# Patient Record
Sex: FEMALE | Race: BLACK OR AFRICAN AMERICAN | Employment: FULL TIME | URBAN - METROPOLITAN AREA
[De-identification: names, ages, dates, MRNs, and addresses within clinical notes are randomized per-mention and may not be internally consistent; named-entity substitution may affect disease eponyms.]

---

## 2017-03-31 ENCOUNTER — APPOINTMENT (OUTPATIENT)
Dept: CT IMAGING | Age: 32
End: 2017-03-31
Attending: PHYSICIAN ASSISTANT
Payer: COMMERCIAL

## 2017-03-31 ENCOUNTER — HOSPITAL ENCOUNTER (EMERGENCY)
Age: 32
Discharge: HOME | End: 2017-03-31
Attending: EMERGENCY MEDICINE | Admitting: EMERGENCY MEDICINE
Payer: COMMERCIAL

## 2017-03-31 VITALS
SYSTOLIC BLOOD PRESSURE: 114 MMHG | HEART RATE: 84 BPM | RESPIRATION RATE: 16 BRPM | DIASTOLIC BLOOD PRESSURE: 74 MMHG | BODY MASS INDEX: 16.79 KG/M2 | TEMPERATURE: 98.1 F | OXYGEN SATURATION: 99 % | HEIGHT: 67 IN | WEIGHT: 107 LBS

## 2017-03-31 DIAGNOSIS — R10.32 ABDOMINAL PAIN, LLQ (LEFT LOWER QUADRANT): Primary | ICD-10-CM

## 2017-03-31 LAB
ALBUMIN SERPL BCP-MCNC: 3.6 G/DL (ref 3.4–5)
ALBUMIN/GLOB SERPL: 1 {RATIO} (ref 0.8–1.7)
ALP SERPL-CCNC: 52 U/L (ref 45–117)
ALT SERPL-CCNC: 18 U/L (ref 13–56)
ANION GAP BLD CALC-SCNC: 11 MMOL/L (ref 3–18)
APPEARANCE UR: ABNORMAL
AST SERPL W P-5'-P-CCNC: 13 U/L (ref 15–37)
BACTERIA URNS QL MICRO: ABNORMAL /HPF
BASOPHILS # BLD AUTO: 0 K/UL (ref 0–0.06)
BASOPHILS # BLD: 0 % (ref 0–2)
BILIRUB SERPL-MCNC: 0.4 MG/DL (ref 0.2–1)
BILIRUB UR QL: NEGATIVE
BUN SERPL-MCNC: 11 MG/DL (ref 7–18)
BUN/CREAT SERPL: 17 (ref 12–20)
CALCIUM SERPL-MCNC: 9.4 MG/DL (ref 8.5–10.1)
CHLORIDE SERPL-SCNC: 105 MMOL/L (ref 100–108)
CO2 SERPL-SCNC: 27 MMOL/L (ref 21–32)
COLOR UR: ABNORMAL
CREAT SERPL-MCNC: 0.63 MG/DL (ref 0.6–1.3)
DIFFERENTIAL METHOD BLD: ABNORMAL
EOSINOPHIL # BLD: 0 K/UL (ref 0–0.4)
EOSINOPHIL NFR BLD: 1 % (ref 0–5)
EPITH CASTS URNS QL MICRO: ABNORMAL /LPF (ref 0–5)
ERYTHROCYTE [DISTWIDTH] IN BLOOD BY AUTOMATED COUNT: 18.7 % (ref 11.6–14.5)
GLOBULIN SER CALC-MCNC: 3.6 G/DL (ref 2–4)
GLUCOSE SERPL-MCNC: 92 MG/DL (ref 74–99)
GLUCOSE UR STRIP.AUTO-MCNC: NEGATIVE MG/DL
HCG UR QL: NEGATIVE
HCT VFR BLD AUTO: 33.6 % (ref 35–45)
HGB BLD-MCNC: 9.7 G/DL (ref 12–16)
HGB UR QL STRIP: ABNORMAL
KETONES UR QL STRIP.AUTO: 40 MG/DL
LEUKOCYTE ESTERASE UR QL STRIP.AUTO: ABNORMAL
LIPASE SERPL-CCNC: 145 U/L (ref 73–393)
LYMPHOCYTES # BLD AUTO: 21 % (ref 21–52)
LYMPHOCYTES # BLD: 0.8 K/UL (ref 0.9–3.6)
MCH RBC QN AUTO: 22.1 PG (ref 24–34)
MCHC RBC AUTO-ENTMCNC: 28.9 G/DL (ref 31–37)
MCV RBC AUTO: 76.7 FL (ref 74–97)
MONOCYTES # BLD: 0.5 K/UL (ref 0.05–1.2)
MONOCYTES NFR BLD AUTO: 12 % (ref 3–10)
MUCOUS THREADS URNS QL MICRO: ABNORMAL /LPF
NEUTS SEG # BLD: 2.4 K/UL (ref 1.8–8)
NEUTS SEG NFR BLD AUTO: 66 % (ref 40–73)
NITRITE UR QL STRIP.AUTO: NEGATIVE
PH UR STRIP: 7 [PH] (ref 5–8)
PLATELET # BLD AUTO: 256 K/UL (ref 135–420)
PMV BLD AUTO: 9.3 FL (ref 9.2–11.8)
POTASSIUM SERPL-SCNC: 3.7 MMOL/L (ref 3.5–5.5)
PROT SERPL-MCNC: 7.2 G/DL (ref 6.4–8.2)
PROT UR STRIP-MCNC: 30 MG/DL
RBC # BLD AUTO: 4.38 M/UL (ref 4.2–5.3)
RBC #/AREA URNS HPF: ABNORMAL /HPF (ref 0–5)
SODIUM SERPL-SCNC: 143 MMOL/L (ref 136–145)
SP GR UR REFRACTOMETRY: >1.03 (ref 1–1.03)
UROBILINOGEN UR QL STRIP.AUTO: 1 EU/DL (ref 0.2–1)
WBC # BLD AUTO: 3.7 K/UL (ref 4.6–13.2)
WBC URNS QL MICRO: ABNORMAL /HPF (ref 0–5)

## 2017-03-31 PROCEDURE — 83690 ASSAY OF LIPASE: CPT | Performed by: PHYSICIAN ASSISTANT

## 2017-03-31 PROCEDURE — 85025 COMPLETE CBC W/AUTO DIFF WBC: CPT | Performed by: PHYSICIAN ASSISTANT

## 2017-03-31 PROCEDURE — 99283 EMERGENCY DEPT VISIT LOW MDM: CPT

## 2017-03-31 PROCEDURE — 96361 HYDRATE IV INFUSION ADD-ON: CPT

## 2017-03-31 PROCEDURE — 80053 COMPREHEN METABOLIC PANEL: CPT | Performed by: PHYSICIAN ASSISTANT

## 2017-03-31 PROCEDURE — 81001 URINALYSIS AUTO W/SCOPE: CPT | Performed by: EMERGENCY MEDICINE

## 2017-03-31 PROCEDURE — 96374 THER/PROPH/DIAG INJ IV PUSH: CPT

## 2017-03-31 PROCEDURE — 74176 CT ABD & PELVIS W/O CONTRAST: CPT

## 2017-03-31 PROCEDURE — 74011250636 HC RX REV CODE- 250/636: Performed by: PHYSICIAN ASSISTANT

## 2017-03-31 PROCEDURE — 81025 URINE PREGNANCY TEST: CPT

## 2017-03-31 RX ORDER — ONDANSETRON 2 MG/ML
4 INJECTION INTRAMUSCULAR; INTRAVENOUS
Status: COMPLETED | OUTPATIENT
Start: 2017-03-31 | End: 2017-03-31

## 2017-03-31 RX ORDER — ONDANSETRON 4 MG/1
4 TABLET, FILM COATED ORAL
Qty: 15 TAB | Refills: 0 | Status: SHIPPED | OUTPATIENT
Start: 2017-03-31 | End: 2017-07-08

## 2017-03-31 RX ADMIN — ONDANSETRON 4 MG: 2 INJECTION INTRAMUSCULAR; INTRAVENOUS at 18:34

## 2017-03-31 RX ADMIN — SODIUM CHLORIDE 1000 ML: 900 INJECTION, SOLUTION INTRAVENOUS at 18:34

## 2017-03-31 NOTE — ED TRIAGE NOTES
C/o abd pain, vomiting for 7 days. States she had a seizure today, denies taking her keppra, states she thinks it causes her to have more seizures, states she drove herself here today. Sepsis Screening completed    (  )Patient meets SIRS criteria. ( x )Patient does not meet SIRS criteria.       SIRS Criteria is achieved when two or more of the following are present   Temperature < 96.8°F (36°C) or > 100.9°F (38.3°C)   Heart Rate > 90 beats per minute   Respiratory Rate > 20 breaths per minute   WBC count > 12,000 or <4,000 or > 10% bands

## 2017-03-31 NOTE — ED PROVIDER NOTES
[address]  EMERGENCY DEPARTMENT HISTORY AND PHYSICAL EXAM       Date: 3/31/2017   Patient Name: Hilda Cook   YOB: 1985  Medical Record Number: [id number]    History of Presenting Illness     Chief Complaint   Patient presents with    Abdominal Pain        History Provided By:  patient    Additional History:   5:38 PM   Hilda Cook is a 34 y.o. female with PSHX of left-sided tubal ligation (5 months ago) presenting to the ED C/O gradually worsening left lower suprapubic pain that radiates to her back at her incision site, onset 1 week ago. Associated sxs include dysuria. Pt has tried Toradol for her pain with minimal relief and stopped taking it due to n/v. LMP last week. Pt asked about seizures and has a PMHX of seizure, but states that this is not why she is here today. PMHX ovarian cysts. Pt denies PMHX of kidney stones, PSHX appendectomy, fever, diarrhea, vaginal bleeding, vaginal discharge, and any other symptoms or complaints. Primary Care Provider: None   Specialist:    Past History     Past Medical History:   Past Medical History:   Diagnosis Date    Seizures ([address]         Past Surgical History:   Past Surgical History:   Procedure Laterality Date    HX GYN      tube and ovary removed on left side        Family History:   No family history on file. Social History:   Social History   Substance Use Topics    Smoking status: Current Every Day Smoker    Smokeless tobacco: Not on file    Alcohol use No        Allergies:   No Known Allergies     Review of Systems   Review of Systems   Constitutional: Negative for fever. Gastrointestinal: Positive for abdominal pain, nausea and vomiting. Negative for diarrhea. Genitourinary: Positive for dysuria. Negative for vaginal bleeding and vaginal discharge. All other systems reviewed and are negative.       Physical Exam  Vitals:    03/31/17 1713 03/31/17 2022   BP: 112/81 114/74   Pulse: 90 84   Resp: 14 16   Temp: 98.1 °F (36.7 °C)    SpO2: 96% 99%   Weight: 48.5 kg (107 lb)    Height: 5' 7\" (1.702 m)        Physical Exam   Constitutional: She is oriented to person, place, and time. She appears well-developed and well-nourished. No distress. Pt appears well lying on bed in no distress, speaking in full sentences without evidence of dyspnea, increased work of breathing or any obvious pain at this time     HENT:   Head: Normocephalic and atraumatic. Neck: Normal range of motion. Neck supple. Cardiovascular: Normal rate, regular rhythm, normal heart sounds and intact distal pulses. No murmur heard. Pulmonary/Chest: Effort normal and breath sounds normal. No respiratory distress. She has no wheezes. She has no rales. Abdominal: Soft. Bowel sounds are normal. There is no hepatosplenomegaly. There is no tenderness. There is no rigidity, no rebound, no guarding and no CVA tenderness. abd soft, TTP over the LLQ and LLQ, non tender when distracted, no guarding or rebound    Genitourinary:   Genitourinary Comments: refused   Neurological: She is alert and oriented to person, place, and time. Skin: Skin is warm and dry. No rash noted. No erythema. No pallor. Psychiatric: She has a normal mood and affect. Judgment normal.   Nursing note and vitals reviewed.       Diagnostic Study Results     Labs -      Recent Results (from the past 12 hour(s))   URINALYSIS W/ RFLX MICROSCOPIC    Collection Time: 03/31/17  5:15 PM   Result Value Ref Range    Color DARK YELLOW      Appearance CLOUDY      Specific gravity >1.030 (H) 1.005 - 1.030    pH (UA) 7.0 5.0 - 8.0      Protein 30 (A) NEG mg/dL    Glucose NEGATIVE  NEG mg/dL    Ketone 40 (A) NEG mg/dL    Bilirubin NEGATIVE  NEG      Blood MODERATE (A) NEG      Urobilinogen 1.0 0.2 - 1.0 EU/dL    Nitrites NEGATIVE  NEG      Leukocyte Esterase SMALL (A) NEG     URINE MICROSCOPIC ONLY    Collection Time: 03/31/17  5:15 PM   Result Value Ref Range    WBC 0 to 3 0 - 5 /hpf RBC 11 to 20 0 - 5 /hpf    Epithelial cells 2+ 0 - 5 /lpf    Bacteria 1+ (A) NEG /hpf    Mucus 4+ (A) NEG /lpf   HCG URINE, QL. - POC    Collection Time: 03/31/17  6:22 PM   Result Value Ref Range    Pregnancy test,urine (POC) NEGATIVE  NEG     CBC WITH AUTOMATED DIFF    Collection Time: 03/31/17  6:32 PM   Result Value Ref Range    WBC 3.7 (L) 4.6 - 13.2 K/uL    RBC 4.38 4.20 - 5.30 M/uL    HGB 9.7 (L) 12.0 - 16.0 g/dL    HCT 33.6 (L) 35.0 - 45.0 %    MCV 76.7 74.0 - 97.0 FL    MCH 22.1 (L) 24.0 - 34.0 PG    MCHC 28.9 (L) 31.0 - 37.0 g/dL    RDW 18.7 (H) 11.6 - 14.5 %    PLATELET 708 641 - 814 K/uL    MPV 9.3 9.2 - 11.8 FL    NEUTROPHILS 66 40 - 73 %    LYMPHOCYTES 21 21 - 52 %    MONOCYTES 12 (H) 3 - 10 %    EOSINOPHILS 1 0 - 5 %    BASOPHILS 0 0 - 2 %    ABS. NEUTROPHILS 2.4 1.8 - 8.0 K/UL    ABS. LYMPHOCYTES 0.8 (L) 0.9 - 3.6 K/UL    ABS. MONOCYTES 0.5 0.05 - 1.2 K/UL    ABS. EOSINOPHILS 0.0 0.0 - 0.4 K/UL    ABS. BASOPHILS 0.0 0.0 - 0.06 K/UL    DF AUTOMATED     METABOLIC PANEL, COMPREHENSIVE    Collection Time: 03/31/17  6:32 PM   Result Value Ref Range    Sodium 143 136 - 145 mmol/L    Potassium 3.7 3.5 - 5.5 mmol/L    Chloride 105 100 - 108 mmol/L    CO2 27 21 - 32 mmol/L    Anion gap 11 3.0 - 18 mmol/L    Glucose 92 74 - 99 mg/dL    BUN 11 7.0 - 18 MG/DL    Creatinine 0.63 0.6 - 1.3 MG/DL    BUN/Creatinine ratio 17 12 - 20      GFR est AA >60 >60 ml/min/1.73m2    GFR est non-AA >60 >60 ml/min/1.73m2    Calcium 9.4 8.5 - 10.1 MG/DL    Bilirubin, total 0.4 0.2 - 1.0 MG/DL    ALT (SGPT) 18 13 - 56 U/L    AST (SGOT) 13 (L) 15 - 37 U/L    Alk. phosphatase 52 45 - 117 U/L    Protein, total 7.2 6.4 - 8.2 g/dL    Albumin 3.6 3.4 - 5.0 g/dL    Globulin 3.6 2.0 - 4.0 g/dL    A-G Ratio 1.0 0.8 - 1.7     LIPASE    Collection Time: 03/31/17  6:32 PM   Result Value Ref Range    Lipase 145 73 - 393 U/L       Radiologic Studies -  CT ABD PELV WO CONT   Final Result   There are no findings to explain the patient's symptoms. The  calcific density in the superior pole the left kidney does not appear to cause  any inflammatory changes or obstruction, and likely represents a chronic finding  for this patient. The appendix is normal.    As read by the radiologist.         Medical Decision Making   I am the first provider for this patient. I reviewed the vital signs, available nursing notes, past medical history, past surgical history, family history and social history. Vital Signs-Reviewed the patient's vital signs. Patient Vitals for the past 12 hrs:   Temp Pulse Resp BP SpO2   03/31/17 2022 - 84 16 114/74 99 %   03/31/17 1713 98.1 °F (36.7 °C) 90 14 112/81 96 %       Pulse Oximetry Analysis - Normal 96% on RA. No intervention needed. Provider Notes:   DDX:   DDX Considered   Diverticulitis, constipation, gastroenteritis, appendicitis, SBO, LBO / fecal impaction, colitis, IBS, inflammatory bowel disease (Crohn's or ulcerative colitis), mesenteric ischemia, hernia (femoral, inguinal, umbilical), renal calculi, pregnancy, ectopic pregnancy, UTI, pyelo       Procedures      ED Course:     5:38 PM Initial assessment performed. Offered pain medication but pt needs a ride present for narcotic pain meds. Will given PO meds or Toradol after CT shows no surgical problems. 8:09 PM Pt states she would like to go home and would not like a pelvic. Discussed results with pt and the importance of following up with her PCP to have her urine rechecked. Pt understands why I am recommending pelvic and still refusing. sts she would just like to go home and take her meds. Pt states she does not have a PCP, but does have insurance. Will refer to a PCP. + leuk esterase w/o WBC in urine, ? Contaminated specimen. Will culture her urine. This was explained to pt, she understands and agrees.      Medications Given in the ED:  Medications   sodium chloride 0.9 % bolus infusion 1,000 mL (0 mL IntraVENous IV Completed 3/31/17 1945)   ondansetron [facility]) injection 4 mg (4 mg IntraVENous Given 3/31/17 1834)          Discharge Note:  8:13 PM  Patients results have been reviewed with them. Patient and/or family have verbally conveyed their understanding and agreement of the patient's signs, symptoms, diagnosis, treatment and prognosis and additionally agree to follow up as recommended or return to the Emergency Room should their condition change prior to their follow-up appointment. Patient verbally agrees with the care-plan and verbally conveys that all of their questions have been answered. Discharge instructions have also been provided to the patient with some educational information regarding their diagnosis as well a list of reasons why they would want to return to the ER prior to their follow-up appointment should their condition change. Diagnosis   Clinical Impression:   1. Abdominal pain, LLQ (left lower quadrant)         Follow-up Information     Follow up With Details Comments Contact Info    Padilla Saini DO Schedule an appointment as soon as possible for a visit in 2 days For recheck of urine. [address] [facility]      [facility] EMERGENCY DEPT  As needed, If symptoms worsen [address] [place] [id number]  [phone]          Discharge Medication List as of 3/31/2017  8:12 PM      START taking these medications    Details   ondansetron hcl (ZOFRAN, AS HYDROCHLORIDE,) 4 mg tablet Take 1 Tab by mouth every eight (8) hours as needed for Nausea. , Print, Disp15 Tab, R-0             _______________________________   Attestations:     SCRIBE ATTESTATION:  This note is prepared by Anila Islas, acting as Scribe for Raghu Salgado PA-C.    PROVIDER ATTESTATION:  Raghu Salgado PA-C: The scribe's documentation has been prepared under my direction and personally reviewed by me in its entirety.  I confirm that the note above accurately reflects all work, treatment, procedures, and medical decision making performed by me.   _______________________________

## 2017-04-01 NOTE — DISCHARGE INSTRUCTIONS

## 2017-04-01 NOTE — ED NOTES
I have reviewed discharge instructions with the patient. The patient verbalized understanding.   Patient armband removed and shredded, script x 1 given

## 2017-04-01 NOTE — ED NOTES
Call light answered, patient does not want pelvic exam and wants to go home. Provider notified and brought to bedside.

## 2017-07-08 ENCOUNTER — HOSPITAL ENCOUNTER (EMERGENCY)
Age: 32
Discharge: HOME | End: 2017-07-08
Attending: EMERGENCY MEDICINE
Payer: COMMERCIAL

## 2017-07-08 ENCOUNTER — APPOINTMENT (OUTPATIENT)
Dept: ULTRASOUND IMAGING | Age: 32
End: 2017-07-08
Attending: EMERGENCY MEDICINE
Payer: COMMERCIAL

## 2017-07-08 VITALS
OXYGEN SATURATION: 100 % | SYSTOLIC BLOOD PRESSURE: 116 MMHG | HEIGHT: 67 IN | WEIGHT: 110 LBS | RESPIRATION RATE: 18 BRPM | HEART RATE: 88 BPM | BODY MASS INDEX: 17.27 KG/M2 | TEMPERATURE: 98.7 F | DIASTOLIC BLOOD PRESSURE: 75 MMHG

## 2017-07-08 DIAGNOSIS — B96.89 BV (BACTERIAL VAGINOSIS): ICD-10-CM

## 2017-07-08 DIAGNOSIS — N76.0 BV (BACTERIAL VAGINOSIS): ICD-10-CM

## 2017-07-08 DIAGNOSIS — R10.2 PELVIC PAIN IN FEMALE: Primary | ICD-10-CM

## 2017-07-08 LAB
ALBUMIN SERPL BCP-MCNC: 3.6 G/DL (ref 3.4–5)
ALBUMIN/GLOB SERPL: 1 {RATIO} (ref 0.8–1.7)
ALP SERPL-CCNC: 62 U/L (ref 45–117)
ALT SERPL-CCNC: 15 U/L (ref 13–56)
ANION GAP BLD CALC-SCNC: 12 MMOL/L (ref 3–18)
APPEARANCE UR: CLEAR
AST SERPL W P-5'-P-CCNC: 13 U/L (ref 15–37)
BACTERIA URNS QL MICRO: ABNORMAL /HPF
BASOPHILS # BLD AUTO: 0 K/UL (ref 0–0.06)
BASOPHILS # BLD: 0 % (ref 0–2)
BILIRUB SERPL-MCNC: 0.3 MG/DL (ref 0.2–1)
BILIRUB UR QL: NEGATIVE
BUN SERPL-MCNC: 11 MG/DL (ref 7–18)
BUN/CREAT SERPL: 13 (ref 12–20)
CALCIUM SERPL-MCNC: 8.8 MG/DL (ref 8.5–10.1)
CHLORIDE SERPL-SCNC: 103 MMOL/L (ref 100–108)
CO2 SERPL-SCNC: 26 MMOL/L (ref 21–32)
COLOR UR: YELLOW
CREAT SERPL-MCNC: 0.82 MG/DL (ref 0.6–1.3)
DIFFERENTIAL METHOD BLD: ABNORMAL
EOSINOPHIL # BLD: 0.2 K/UL (ref 0–0.4)
EOSINOPHIL NFR BLD: 3 % (ref 0–5)
EPITH CASTS URNS QL MICRO: ABNORMAL /LPF (ref 0–5)
ERYTHROCYTE [DISTWIDTH] IN BLOOD BY AUTOMATED COUNT: 18.8 % (ref 11.6–14.5)
GLOBULIN SER CALC-MCNC: 3.6 G/DL (ref 2–4)
GLUCOSE SERPL-MCNC: 110 MG/DL (ref 74–99)
GLUCOSE UR STRIP.AUTO-MCNC: NEGATIVE MG/DL
HCG UR QL: NEGATIVE
HCT VFR BLD AUTO: 34.4 % (ref 35–45)
HGB BLD-MCNC: 10.3 G/DL (ref 12–16)
HGB UR QL STRIP: ABNORMAL
KETONES UR QL STRIP.AUTO: NEGATIVE MG/DL
LEUKOCYTE ESTERASE UR QL STRIP.AUTO: ABNORMAL
LIPASE SERPL-CCNC: 132 U/L (ref 73–393)
LYMPHOCYTES # BLD AUTO: 20 % (ref 21–52)
LYMPHOCYTES # BLD: 1.1 K/UL (ref 0.9–3.6)
MCH RBC QN AUTO: 23.1 PG (ref 24–34)
MCHC RBC AUTO-ENTMCNC: 29.9 G/DL (ref 31–37)
MCV RBC AUTO: 77.1 FL (ref 74–97)
MONOCYTES # BLD: 0.8 K/UL (ref 0.05–1.2)
MONOCYTES NFR BLD AUTO: 13 % (ref 3–10)
MUCOUS THREADS URNS QL MICRO: ABNORMAL /LPF
NEUTS SEG # BLD: 3.6 K/UL (ref 1.8–8)
NEUTS SEG NFR BLD AUTO: 64 % (ref 40–73)
NITRITE UR QL STRIP.AUTO: NEGATIVE
PH UR STRIP: >8.5 [PH] (ref 5–8)
PLATELET # BLD AUTO: 267 K/UL (ref 135–420)
PMV BLD AUTO: 10.2 FL (ref 9.2–11.8)
POTASSIUM SERPL-SCNC: 3.7 MMOL/L (ref 3.5–5.5)
PROT SERPL-MCNC: 7.2 G/DL (ref 6.4–8.2)
PROT UR STRIP-MCNC: 30 MG/DL
RBC # BLD AUTO: 4.46 M/UL (ref 4.2–5.3)
RBC #/AREA URNS HPF: ABNORMAL /HPF (ref 0–5)
SERVICE CMNT-IMP: NORMAL
SODIUM SERPL-SCNC: 141 MMOL/L (ref 136–145)
SP GR UR REFRACTOMETRY: 1.02 (ref 1–1.03)
UROBILINOGEN UR QL STRIP.AUTO: 1 EU/DL (ref 0.2–1)
WBC # BLD AUTO: 5.6 K/UL (ref 4.6–13.2)
WBC URNS QL MICRO: ABNORMAL /HPF (ref 0–5)
WET PREP GENITAL: NORMAL

## 2017-07-08 PROCEDURE — 85025 COMPLETE CBC W/AUTO DIFF WBC: CPT | Performed by: EMERGENCY MEDICINE

## 2017-07-08 PROCEDURE — 87086 URINE CULTURE/COLONY COUNT: CPT | Performed by: EMERGENCY MEDICINE

## 2017-07-08 PROCEDURE — 96374 THER/PROPH/DIAG INJ IV PUSH: CPT

## 2017-07-08 PROCEDURE — 81025 URINE PREGNANCY TEST: CPT

## 2017-07-08 PROCEDURE — 96372 THER/PROPH/DIAG INJ SC/IM: CPT

## 2017-07-08 PROCEDURE — 81001 URINALYSIS AUTO W/SCOPE: CPT | Performed by: EMERGENCY MEDICINE

## 2017-07-08 PROCEDURE — 87491 CHLMYD TRACH DNA AMP PROBE: CPT | Performed by: EMERGENCY MEDICINE

## 2017-07-08 PROCEDURE — 83690 ASSAY OF LIPASE: CPT | Performed by: EMERGENCY MEDICINE

## 2017-07-08 PROCEDURE — 80053 COMPREHEN METABOLIC PANEL: CPT | Performed by: EMERGENCY MEDICINE

## 2017-07-08 PROCEDURE — 76830 TRANSVAGINAL US NON-OB: CPT

## 2017-07-08 PROCEDURE — 96361 HYDRATE IV INFUSION ADD-ON: CPT

## 2017-07-08 PROCEDURE — 74011250636 HC RX REV CODE- 250/636: Performed by: EMERGENCY MEDICINE

## 2017-07-08 PROCEDURE — 87210 SMEAR WET MOUNT SALINE/INK: CPT | Performed by: EMERGENCY MEDICINE

## 2017-07-08 PROCEDURE — 99284 EMERGENCY DEPT VISIT MOD MDM: CPT

## 2017-07-08 PROCEDURE — 96375 TX/PRO/DX INJ NEW DRUG ADDON: CPT

## 2017-07-08 RX ORDER — KETOROLAC TROMETHAMINE 30 MG/ML
30 INJECTION, SOLUTION INTRAMUSCULAR; INTRAVENOUS
Status: COMPLETED | OUTPATIENT
Start: 2017-07-08 | End: 2017-07-08

## 2017-07-08 RX ORDER — HYDROMORPHONE HYDROCHLORIDE 1 MG/ML
1 INJECTION, SOLUTION INTRAMUSCULAR; INTRAVENOUS; SUBCUTANEOUS ONCE
Status: COMPLETED | OUTPATIENT
Start: 2017-07-08 | End: 2017-07-08

## 2017-07-08 RX ORDER — METRONIDAZOLE 500 MG/1
2000 TABLET ORAL
Qty: 4 TAB | Refills: 0 | Status: SHIPPED | OUTPATIENT
Start: 2017-07-08 | End: 2017-07-08

## 2017-07-08 RX ORDER — DICYCLOMINE HYDROCHLORIDE 10 MG/1
20 CAPSULE ORAL
Status: DISCONTINUED | OUTPATIENT
Start: 2017-07-08 | End: 2017-07-08

## 2017-07-08 RX ORDER — DICYCLOMINE HYDROCHLORIDE 10 MG/ML
20 INJECTION INTRAMUSCULAR ONCE
Status: COMPLETED | OUTPATIENT
Start: 2017-07-08 | End: 2017-07-08

## 2017-07-08 RX ORDER — HYDROCODONE BITARTRATE AND ACETAMINOPHEN 5; 325 MG/1; MG/1
1 TABLET ORAL
Qty: 10 TAB | Refills: 0 | Status: SHIPPED | OUTPATIENT
Start: 2017-07-08 | End: 2018-03-06

## 2017-07-08 RX ORDER — ONDANSETRON 2 MG/ML
4 INJECTION INTRAMUSCULAR; INTRAVENOUS
Status: COMPLETED | OUTPATIENT
Start: 2017-07-08 | End: 2017-07-08

## 2017-07-08 RX ORDER — HYDROMORPHONE HYDROCHLORIDE 1 MG/ML
1 INJECTION, SOLUTION INTRAMUSCULAR; INTRAVENOUS; SUBCUTANEOUS
Status: DISCONTINUED | OUTPATIENT
Start: 2017-07-08 | End: 2017-07-08 | Stop reason: HOSPADM

## 2017-07-08 RX ADMIN — ONDANSETRON 4 MG: 2 INJECTION INTRAMUSCULAR; INTRAVENOUS at 06:39

## 2017-07-08 RX ADMIN — HYDROMORPHONE HYDROCHLORIDE 1 MG: 1 INJECTION, SOLUTION INTRAMUSCULAR; INTRAVENOUS; SUBCUTANEOUS at 07:02

## 2017-07-08 RX ADMIN — SODIUM CHLORIDE 1000 ML: 900 INJECTION, SOLUTION INTRAVENOUS at 06:38

## 2017-07-08 RX ADMIN — DICYCLOMINE HYDROCHLORIDE 20 MG: 20 INJECTION, SOLUTION INTRAMUSCULAR at 07:54

## 2017-07-08 RX ADMIN — ONDANSETRON 4 MG: 2 INJECTION INTRAMUSCULAR; INTRAVENOUS at 07:02

## 2017-07-08 RX ADMIN — KETOROLAC TROMETHAMINE 30 MG: 30 INJECTION, SOLUTION INTRAMUSCULAR at 06:40

## 2017-07-08 NOTE — ED NOTES
RN in room for purpose of hourly rounding. Room checked for trash and safety hazards. Patient is. ..    [  ] seated at bedside. [x  ] lying in bed with side rails up and call bell in reach. [  ] lying in with call bell in reach and continuous monitoring in place. Pain reduction interventions. ..    [  ] not indicated at this time      include the following   [  ] administration of analgesic medications   [  ] lights turned down   [  ] patient offered a cool washcloth   [  ] heat applied   [  ] ice applied   [ x ] patient repositioned    Restroom needs addressed. Patient is. ... [ x ] Not in need restroom assistance at this time  [  ] Ambulatory as needed to the restroom  [  ] Assisted to bedside commode  [  ] Assisted with use of bed pan  [  ] Provided with a urinal    Position. .. [x  ] Patient does not require assistance with repositioning  [  ] Patient repositions with assistance of RN  [  ] Patient repositioned with assistance of RN and other ED staff.

## 2017-07-08 NOTE — ED NOTES
Report received from Sacha Peguero, pt laying in bed, appears to be in pain, updated pt that she just received IV dilaudid and we will continue to monitor her pain.

## 2017-07-08 NOTE — ED TRIAGE NOTES
LLQ abdominal pain with N/V/D x 2 days. Denies vaginal bleeding or discharge or dysuria. Denies fever or chills. Sepsis Screening completed    (  )Patient meets SIRS criteria. ( xx )Patient does not meet SIRS criteria.       SIRS Criteria is achieved when two or more of the following are present   Temperature < 96.8°F (36°C) or > 100.9°F (38.3°C)   Heart Rate > 90 beats per minute   Respiratory Rate > 20 breaths per minute   WBC count > 12,000 or <4,000 or > 10% bands

## 2017-07-08 NOTE — ED NOTES
RN in room for purpose of hourly rounding. Room checked for trash and safety hazards. Patient is. ..    [  ] seated at bedside. [x  ] lying in bed with side rails up and call bell in reach. [  ] lying in with call bell in reach and continuous monitoring in place. Pain reduction interventions. .. [ x ] not indicated at this time      include the following   [  ] administration of analgesic medications   [  ] lights turned down   [  ] patient offered a cool washcloth   [  ] heat applied   [  ] ice applied   [  ] patient repositioned    Restroom needs addressed. Patient is. ... [ x ] Not in need restroom assistance at this time  [  ] Ambulatory as needed to the restroom  [  ] Assisted to bedside commode  [  ] Assisted with use of bed pan  [  ] Provided with a urinal    Position. .. [ x ] Patient does not require assistance with repositioning  [  ] Patient repositions with assistance of RN  [  ] Patient repositioned with assistance of RN and other ED staff.

## 2017-07-08 NOTE — ED NOTES
Bedside shift change report given to Anjali Ibarra RN (oncoming nurse) by Janeen Lawson RN (offgoing nurse). Report included the following information SBAR, ED Summary and MAR.

## 2017-07-08 NOTE — DISCHARGE INSTRUCTIONS
Bacterial Vaginosis: Care Instructions  Your Care Instructions    Bacterial vaginosis is a type of vaginal infection. It is caused by excess growth of certain bacteria that are normally found in the vagina. Symptoms can include itching, swelling, pain when you urinate or have sex, and a gray or yellow discharge with a \"fishy\" odor. It is not considered an infection that is spread through sexual contact. Although symptoms can be annoying and uncomfortable, bacterial vaginosis does not usually cause other health problems. However, if you have it while you are pregnant, it can cause complications. While the infection may go away on its own, most doctors use antibiotics to treat it. You may have been prescribed pills or vaginal cream. With treatment, bacterial vaginosis usually clears up in 5 to 7 days. Follow-up care is a key part of your treatment and safety. Be sure to make and go to all appointments, and call your doctor if you are having problems. It's also a good idea to know your test results and keep a list of the medicines you take. How can you care for yourself at home? · Take your antibiotics as directed. Do not stop taking them just because you feel better. You need to take the full course of antibiotics. · Do not eat or drink anything that contains alcohol if you are taking metronidazole (Flagyl). · Keep using your medicine if you start your period. Use pads instead of tampons while using a vaginal cream or suppository. Tampons can absorb the medicine. · Wear loose cotton clothing. Do not wear nylon and other materials that hold body heat and moisture close to the skin. · Do not scratch. Relieve itching with a cold pack or a cool bath. · Do not wash your vaginal area more than once a day. Use plain water or a mild, unscented soap. Do not douche. When should you call for help?   Watch closely for changes in your health, and be sure to contact your doctor if:  · You have unexpected vaginal bleeding. · You have a fever. · You have new or increased pain in your vagina or pelvis. · You are not getting better after 1 week. · Your symptoms return after you finish the course of your medicine. Where can you learn more? Go to http://joanne-yana.info/. [facility] in the search box to learn more about \"Bacterial Vaginosis: Care Instructions. \"  Current as of: October 13, 2016  Content Version: 11.3  © 5306-9758 [employer]. Care instructions adapted under license by [employer] (which disclaims liability or warranty for this information). If you have questions about a medical condition or this instruction, always ask your healthcare professional. [address] any warranty or liability for your use of this information. Uterine Fibroids: Care Instructions  Your Care Instructions    Uterine fibroids are growths in the uterus. Fibroids aren't cancer. Doctors don't know what causes fibroids. Fibroids are very common in women during their childbearing years. Fibroids can grow on the inside of the uterus, in the muscle wall of the uterus, or near the outside wall of the uterus. In some women, fibroids cause painful cramps and heavy periods. In these cases, taking anti-inflammatory medicines, birth control pills, or using an intrauterine device (IUD) often helps decrease symptoms. Sometimes surgery is needed to treat fibroids. But if you are near menopause, you may want to wait and see if your symptoms get better. Most fibroids shrink and go away after menopause, when your menstrual periods stop completely. Follow-up care is a key part of your treatment and safety. Be sure to make and go to all appointments, and call your doctor if you are having problems. It's also a good idea to know your test results and keep a list of the medicines you take. How can you care for yourself at home?   · If your doctor gave you medicine, take it as exactly as prescribed. Call your doctor if you think you are having a problem with your medicine. · Take anti-inflammatory medicines for pain. These include ibuprofen (Advil, Motrin) and naproxen (Aleve). Read and follow all instructions on the label. · Use heat, such as a hot water bottle or a heating pad set on low, or a warm bath to relax tense muscles and relieve cramping. Put a thin cloth between the heating pad and your skin. Never go to sleep with a heating pad on. · Lie down and put a pillow under your knees. Or, lie on your side and bring your knees up to your chest. These positions may help relieve belly pain or pressure. · Keep track of how many sanitary pads or tampons you use each day. · Get at least 30 minutes of exercise on most days of the week. Walking is a good choice. You also may want to do other activities, such as running, swimming, cycling, or playing tennis or team sports. · If you bleed longer than usual or have heavy bleeding, take a daily multivitamin with iron. When should you call for help? Call 911 anytime you think you may need emergency care. For example, call if:  · You passed out (lost consciousness). · You have sudden, severe pain in your belly or pelvis. Call your doctor now or seek immediate medical care if:  · You have severe vaginal bleeding. This means that you are soaking through your usual pads each hour for 2 or more hours. · You have new belly or pelvic pain. · You are dizzy or lightheaded, or you feel like you may faint. · You have new or unexpected vaginal bleeding. Watch closely for changes in your health, and be sure to contact your doctor if:  · You have new vaginal discharge. · You have ongoing heavy or irregular vaginal bleeding. · You have pelvic pain or a heavy feeling in your lower belly that doesn't go away. · You have to urinate often. · You are more constipated than usual.  Where can you learn more?   Go to http://joanne-ynaa.info/. Enter B121 in the search box to learn more about \"Uterine Fibroids: Care Instructions. \"  Current as of: October 13, 2016  Content Version: 11.3  © 1993-1906 [employer]. Care instructions adapted under license by [employer] (which disclaims liability or warranty for this information). If you have questions about a medical condition or this instruction, always ask your healthcare professional. [place] [address] any warranty or liability for your use of this information. Functional Ovarian Cyst: Care Instructions  Your Care Instructions    A functional ovarian cyst is a sac that forms on the surface of a woman's ovary during ovulation. The sac holds a maturing egg. Usually the sac goes away after the egg is released. But if the egg is not released, or if the sac closes up after the egg is released, the sac can swell up with fluid and form a cyst.  Functional ovarian cysts are different than ovarian growths caused by other problems, such as cancer. Most functional ovarian cysts cause no symptoms and go away on their own. Some cause mild pain. Others can cause severe pain when they rupture or bleed. Follow-up care is a key part of your treatment and safety. Be sure to make and go to all appointments, and call your doctor if you are having problems. It's also a good idea to know your test results and keep a list of the medicines you take. How can you care for yourself at home? · Use heat, such as a hot water bottle, a heating pad set on low, or a warm bath, to relax tense muscles and relieve cramping. · Take pain medicines exactly as directed. ¨ If the doctor gave you a prescription medicine for pain, take it as prescribed. ¨ If you are not taking a prescription pain medicine, ask your doctor if you can take an over-the-counter medicine. · Avoid constipation.  Make sure you drink enough fluids and include fruits, vegetables, and fiber in your diet each day. Constipation does not cause ovarian cysts, but it may make your pelvic pain worse. When should you call for help? Call 911 anytime you think you may need emergency care. For example, call if:  · You passed out (lost consciousness). · You have sudden, severe pain in your belly or your pelvis. Call your doctor now or seek immediate medical care if:  · You have new belly or pelvic pain, or your pain gets worse. · You have severe vaginal bleeding. This means that you are soaking through your usual pads or tampons each hour for 2 or more hours. · You are dizzy or lightheaded, or you feel like you may faint. · You have pain or bleeding during or after sex. Watch closely for changes in your health, and be sure to contact your doctor if:  · Your pain keeps you from doing the things that you enjoy. · You do not get better as expected. Where can you learn more? Go to http://joanne-yana.info/. Enter I482 in the search box to learn more about \"Functional Ovarian Cyst: Care Instructions. \"  Current as of: October 13, 2016  Content Version: 11.3  © 0742-9892 [employer]. Care instructions adapted under license by [employer] (which disclaims liability or warranty for this information). If you have questions about a medical condition or this instruction, always ask your healthcare professional. [place] [address] any warranty or liability for your use of this information. Pelvic Pain: Care Instructions  Your Care Instructions    Pelvic pain, or pain in the lower belly, can have many causes. Often pelvic pain is not serious and gets better in a few days. If your pain continues or gets worse, you may need tests and treatment. Tell your doctor about any new symptoms. These may be signs of a serious problem. Follow-up care is a key part of your treatment and safety.  Be sure to make and go to all appointments, and call your doctor if you are having problems. It's also a good idea to know your test results and keep a list of the medicines you take. How can you care for yourself at home? · Rest until you feel better. Lie down, and raise your legs by placing a pillow under your knees. · Drink plenty of fluids. You may find that small, frequent sips are easier on your stomach than if you drink a lot at once. Avoid drinks with carbonation or caffeine, such as soda pop, tea, or coffee. · Try eating several small meals instead of 2 or 3 large ones. Eat mild foods, such as rice, dry toast or crackers, bananas, and applesauce. Avoid fatty and spicy foods, other fruits, and alcohol until 48 hours after your symptoms have gone away. · Take an over-the-counter pain medicine, such as acetaminophen (Tylenol), ibuprofen (Advil, Motrin), or naproxen (Aleve). Read and follow all instructions on the label. · Do not take two or more pain medicines at the same time unless the doctor told you to. Many pain medicines have acetaminophen, which is Tylenol. Too much acetaminophen (Tylenol) can be harmful. · You can put a heating pad, a warm cloth, or moist heat on your belly to relieve pain. When should you call for help? Call 911 anytime you think you may need emergency care. For example, call if:  · You passed out (lost consciousness). Call your doctor now or seek immediate medical care if:  · Your pain is getting worse. · Your pain becomes focused in one area of your belly. · You have severe vaginal bleeding. Severe means that you are soaking through your usual pads or tampons every hour for 2 or more hours and passing clots of blood. · You have new symptoms such as fever, urinary problems or unexpected vaginal bleeding. · You are dizzy or lightheaded, or you feel like you may faint. Watch closely for changes in your health, and be sure to contact your doctor if:  · You do not get better as expected. Where can you learn more?   Go to http://joanne-yana.info/. Enter [id number] in the search box to learn more about \"Pelvic Pain: Care Instructions. \"  Current as of: October 13, 2016  Content Version: 11.3  © 3758-5130 [employer]. Care instructions adapted under license by [employer] (which disclaims liability or warranty for this information). If you have questions about a medical condition or this instruction, always ask your healthcare professional. [place] [address] any warranty or liability for your use of this information.

## 2017-07-08 NOTE — ED NOTES
RN in room for purpose of hourly rounding. Room checked for trash and safety hazards. Patient is. ..    [  ] seated at bedside. [x  ] lying in bed with side rails up and call bell in reach. [  ] lying in with call bell in reach and continuous monitoring in place. Pain reduction interventions. .. [x  ] not indicated at this time      include the following   [  ] administration of analgesic medications   [  ] lights turned down   [  ] patient offered a cool washcloth   [  ] heat applied   [  ] ice applied   [  ] patient repositioned    Restroom needs addressed. Patient is. ... [x  ] Not in need restroom assistance at this time  [  ] Ambulatory as needed to the restroom  [  ] Assisted to bedside commode  [  ] Assisted with use of bed pan  [  ] Provided with a urinal    Position. .. [ x ] Patient does not require assistance with repositioning  [  ] Patient repositions with assistance of RN  [  ] Patient repositioned with assistance of RN and other ED staff.

## 2017-07-08 NOTE — ED NOTES
RN in room for purpose of hourly rounding. Room checked for trash and safety hazards. Patient is. ..    [  ] seated at bedside. [ x ] lying in bed with side rails up and call bell in reach. [  ] lying in with call bell in reach and continuous monitoring in place. Pain reduction interventions. ..    [  ] not indicated at this time      include the following   [  ] administration of analgesic medications   [  ] lights turned down   [  ] patient offered a cool washcloth   [  ] heat applied   [  ] ice applied   [ x ] patient repositioned    Restroom needs addressed. Patient is. ... [ x ] Not in need restroom assistance at this time  [  ] Ambulatory as needed to the restroom  [  ] Assisted to bedside commode  [  ] Assisted with use of bed pan  [  ] Provided with a urinal    Position. .. [x  ] Patient does not require assistance with repositioning  [  ] Patient repositions with assistance of RN  [  ] Patient repositioned with assistance of RN and other ED staff.

## 2017-07-08 NOTE — ED PROVIDER NOTES
[address]  EMERGENCY DEPARTMENT HISTORY AND PHYSICAL EXAM       Date: 7/8/2017   Patient Name: Marci Villar   YOB: 1985  Medical Record Number: [id number]    History of Presenting Illness     Chief Complaint   Patient presents with    Abdominal Pain    Vomiting        History Provided By:  patient    Additional History: 6:15 AM  Marci Vilalr is a 34 y.o. female who presents to the emergency department C/O 7/10 left sided abdominal pain onset 2 days ago. Associated symptoms include N/V/D. Diarrhea began 11 hours ago. [id number]. Also c/o right hand stiffness. Pt has taken Toradol for pain, which she takes on a monthly basisReports recent traveled to [place] 2 weeks ago on a cruise. PMHx of seizures. PSHx of cervix removal due to chronic pain and left sided salpingectomy. Endorses tobacco use (~1/2 PPD). Endorses rare EtOH use, last drink was yesterday. FHx of DM (mother) and sickle cell (sister). Pt denies fever, chills, dysuria, difficulty urinating, hx ectopic pregnancy, CP, SOB, vaginal bleeding, and any other Sx or complaints. Primary Care Provider: None   Specialist:    Past History     Past Medical History:   Past Medical History:   Diagnosis Date    Seizures ([address]         Past Surgical History:   Past Surgical History:   Procedure Laterality Date    HX GYN      tube and ovary removed on left side        Family History:   History reviewed. No pertinent family history. Social History:   Social History   Substance Use Topics    Smoking status: Current Every Day Smoker    Smokeless tobacco: Never Used    Alcohol use No        Allergies:   No Known Allergies     Review of Systems   Review of Systems   Constitutional: Negative for chills and fever. Respiratory: Negative for shortness of breath. Cardiovascular: Negative for chest pain. Gastrointestinal: Positive for abdominal pain, diarrhea, nausea and vomiting.    Genitourinary: Negative for difficulty urinating, dysuria and vaginal bleeding. Musculoskeletal: Positive for arthralgias (right hand stiffness). All other systems reviewed and are negative. Physical Exam  Vitals:    07/08/17 0647 07/08/17 0912 07/08/17 1004 07/08/17 1141   BP: 125/65 101/75 99/72 114/73   Pulse: 89 63 65 71   Resp: 18      Temp: 98.7 °F (37.1 °C)      SpO2: 100% 100% 100% 100%   Weight: 49.9 kg (110 lb)      Height: 5' 7\" (1.702 m)          Physical Exam   Constitutional: She is oriented to person, place, and time. She appears well-developed and well-nourished. Non-toxic appearance. No distress. Uncomfortable appearing. Tearful. HENT:   Head: Normocephalic and atraumatic. Right Ear: External ear normal.   Left Ear: External ear normal.   Mouth/Throat: Oropharynx is clear and moist. No oropharyngeal exudate. Eyes: Conjunctivae and EOM are normal. Pupils are equal, round, and reactive to light. No scleral icterus. No pallor   Neck: Normal range of motion. Neck supple. No JVD present. No tracheal deviation present. No thyromegaly present. Cardiovascular: Normal rate, regular rhythm and normal heart sounds. Pulmonary/Chest: Effort normal and breath sounds normal. No stridor. No respiratory distress. Abdominal: Soft. Bowel sounds are normal. She exhibits no distension. There is tenderness in the left upper quadrant and left lower quadrant. There is guarding (voluntary). There is no rebound and no CVA tenderness. No true peritoneal signs. Genitourinary: Vagina normal. There is no lesion on the right labia. There is no lesion on the left labia. Genitourinary Comments: Vaginismus. NEFG. No adenopathy. No visible lesions. Musculoskeletal: Normal range of motion. She exhibits no edema or tenderness. No soft tissue injuries   Lymphadenopathy:     She has no cervical adenopathy. Neurological: She is alert and oriented to person, place, and time. She has normal reflexes. No cranial nerve deficit. Coordination normal.   Skin: Skin is warm and dry. No rash noted. She is not diaphoretic. No erythema. Psychiatric: She has a normal mood and affect. Her behavior is normal. Judgment and thought content normal.   Nursing note and vitals reviewed. Diagnostic Study Results     Labs -      Recent Results (from the past 12 hour(s))   CBC WITH AUTOMATED DIFF    Collection Time: 07/08/17  6:35 AM   Result Value Ref Range    WBC 5.6 4.6 - 13.2 K/uL    RBC 4.46 4.20 - 5.30 M/uL    HGB 10.3 (L) 12.0 - 16.0 g/dL    HCT 34.4 (L) 35.0 - 45.0 %    MCV 77.1 74.0 - 97.0 FL    MCH 23.1 (L) 24.0 - 34.0 PG    MCHC 29.9 (L) 31.0 - 37.0 g/dL    RDW 18.8 (H) 11.6 - 14.5 %    PLATELET 761 885 - 770 K/uL    MPV 10.2 9.2 - 11.8 FL    NEUTROPHILS 64 40 - 73 %    LYMPHOCYTES 20 (L) 21 - 52 %    MONOCYTES 13 (H) 3 - 10 %    EOSINOPHILS 3 0 - 5 %    BASOPHILS 0 0 - 2 %    ABS. NEUTROPHILS 3.6 1.8 - 8.0 K/UL    ABS. LYMPHOCYTES 1.1 0.9 - 3.6 K/UL    ABS. MONOCYTES 0.8 0.05 - 1.2 K/UL    ABS. EOSINOPHILS 0.2 0.0 - 0.4 K/UL    ABS. BASOPHILS 0.0 0.0 - 0.06 K/UL    DF AUTOMATED     METABOLIC PANEL, COMPREHENSIVE    Collection Time: 07/08/17  6:35 AM   Result Value Ref Range    Sodium 141 136 - 145 mmol/L    Potassium 3.7 3.5 - 5.5 mmol/L    Chloride 103 100 - 108 mmol/L    CO2 26 21 - 32 mmol/L    Anion gap 12 3.0 - 18 mmol/L    Glucose 110 (H) 74 - 99 mg/dL    BUN 11 7.0 - 18 MG/DL    Creatinine 0.82 0.6 - 1.3 MG/DL    BUN/Creatinine ratio 13 12 - 20      GFR est AA >60 >60 ml/min/1.73m2    GFR est non-AA >60 >60 ml/min/1.73m2    Calcium 8.8 8.5 - 10.1 MG/DL    Bilirubin, total 0.3 0.2 - 1.0 MG/DL    ALT (SGPT) 15 13 - 56 U/L    AST (SGOT) 13 (L) 15 - 37 U/L    Alk.  phosphatase 62 45 - 117 U/L    Protein, total 7.2 6.4 - 8.2 g/dL    Albumin 3.6 3.4 - 5.0 g/dL    Globulin 3.6 2.0 - 4.0 g/dL    A-G Ratio 1.0 0.8 - 1.7     LIPASE    Collection Time: 07/08/17  6:35 AM   Result Value Ref Range    Lipase 132 73 - 393 U/L   URINALYSIS W/ RFLX MICROSCOPIC    Collection Time: 07/08/17  6:40 AM   Result Value Ref Range    Color YELLOW      Appearance CLEAR      Specific gravity 1.022 1.005 - 1.030      pH (UA) >8.5 (H) 5.0 - 8.0    Protein 30 (A) NEG mg/dL    Glucose NEGATIVE  NEG mg/dL    Ketone NEGATIVE  NEG mg/dL    Bilirubin NEGATIVE  NEG      Blood TRACE (A) NEG      Urobilinogen 1.0 0.2 - 1.0 EU/dL    Nitrites NEGATIVE  NEG      Leukocyte Esterase TRACE (A) NEG     URINE MICROSCOPIC ONLY    Collection Time: 07/08/17  6:40 AM   Result Value Ref Range    WBC 2 to 3 0 - 5 /hpf    RBC 8 to 10 0 - 5 /hpf    Epithelial cells 4 to 6 0 - 5 /lpf    Bacteria FEW (A) NEG /hpf    Mucus FEW (A) NEG /lpf   HCG URINE, QL. - POC    Collection Time: 07/08/17  6:47 AM   Result Value Ref Range    Pregnancy test,urine (POC) NEGATIVE  NEG     WET PREP    Collection Time: 07/08/17  7:00 AM   Result Value Ref Range    Special Requests: NO SPECIAL REQUESTS      Wet prep CLUE CELLS PRESENT      Wet prep NO YEAST SEEN      Wet prep NO TRICHOMONAS SEEN         Radiologic Studies -  The following have been ordered and reviewed:     US TRANSVAGINAL   Final Result   Fibroid uterus     Physiologic right ovarian cyst    As read by the radiologist.          Medical Decision Making   I am the first provider for this patient. I reviewed the vital signs, available nursing notes, past medical history, past surgical history, family history and social history. Vital Signs-Reviewed the patient's vital signs. Patient Vitals for the past 12 hrs:   Temp Pulse Resp BP SpO2   07/08/17 1141 - 71 - 114/73 100 %   07/08/17 1004 - 65 - 99/72 100 %   07/08/17 0912 - 63 - 101/75 100 %   07/08/17 0647 98.7 °F (37.1 °C) 89 18 125/65 100 %       Pulse Oximetry Analysis - Normal 100% on room air     Old Medical Records: Nursing notes. Provider Notes:   DDx: most likely acute exacerbation of chronic pain, but should also consider ectopic, ovarian torsion or cyst, endometriosis.  Diarrhea does suggest colitis or diverticular dz, which she states is unusual.     Procedures:   Procedures    ED Course:  6:15 AM  Initial assessment performed. The patients presenting problems have been discussed, and they are in agreement with the care plan formulated and outlined with them. I have encouraged them to ask questions as they arise throughout their visit. PROCEDURE NOTE - PELVIC EXAM:    6:49 AM  Performed by: Lis Ralph MD  Pelvic exam was performed using bimanual and speculum. Further findings noted in physical exam. GC/chlamydia and wet prep collected and sent to lab. Procedure chaperoned by nursing staff. The procedure took 1-15 minutes, and pt tolerated well. Written by Sheba Blackmon ED Scribe, as dictated by Mitch. Humberto Ralph MD.    BEDSIDE TRANSFER OF CARE:  8:00 AM  Patient care will be transferred from [facility]. Humberto Ralph MD to Messi Sanchez MD.  Discussed available diagnostic results and care plan at length at beside. Patient and family made aware of provider change. All questions answered. Patient and family voiced understanding. 11:14 AM   Pt returns from [address] c/o increased pain. Will give another dose of Dilaudid. Medications Given in the ED:  Medications   HYDROmorphone (PF) (DILAUDID) injection 1 mg (not administered)   sodium chloride 0.9 % bolus infusion 1,000 mL (0 mL IntraVENous IV Completed 7/8/17 0830)   ondansetron (ZOFRAN) injection 4 mg (4 mg IntraVENous Given 7/8/17 0639)   ketorolac (TORADOL) injection 30 mg (30 mg IntraVENous Given 7/8/17 0640)   HYDROmorphone (PF) (DILAUDID) injection 1 mg (1 mg IntraVENous Given 7/8/17 0702)   ondansetron (ZOFRAN) injection 4 mg (4 mg IntraVENous Given 7/8/17 0702)   dicyclomine (BENTYL) 10 mg/mL injection 20 mg (20 mg IntraMUSCular Given 7/8/17 0754)       Discharge Note:  1:34 PM  Pt has been reexamined. Patient has no new complaints, changes, or physical findings. Care plan outlined and precautions discussed.   Results were reviewed with the patient. All medications were reviewed with the patient; will d/c home with Flagyl and Norco. All of pt's questions and concerns were addressed. Patient was instructed and agrees to follow up with Dr. Patricio Juárez, as well as to return to the ED upon further deterioration. Patient is ready to go home. Diagnosis   Clinical Impression:   1. Pelvic pain in female    2. BV (bacterial vaginosis)       Discussion: Pt presented with pelvic pain. Shes had chronic pelvic pain in the past resulting in the removal of left ovary. Her blood work was essentially normal. She does have some BV. Her pelvic US was negative. Mostly likely this is an acute flare up of her chronic pain. Will tx with pain meds, refer to OB/GYN, and treat BV with Flagyl. Follow-up Information     Follow up With Details Comments Contact Ady Donnelly MD Schedule an appointment as soon as possible for a visit in 2 days OB/GYN follow up.  [facility] EMERGENCY DEPT  As needed, If symptoms worsen [address]  [phone]          Current Discharge Medication List      START taking these medications    Details   HYDROcodone-acetaminophen (NORCO) 5-325 mg per tablet Take 1 Tab by mouth every four (4) hours as needed for Pain. Max Daily Amount: 6 Tabs. Qty: 10 Tab, Refills: 0      metroNIDAZOLE (FLAGYL) 500 mg tablet Take 4 Tabs by mouth now for 1 dose. Take all 4 pills one time. Qty: 4 Tab, Refills: 0             _______________________________   Attestations: This note is prepared by Cristina Royal, acting as a Scribe for Tom Marcelino. Juan Manuel Owens MD on 6:13 AM on 7/8/2017. Tom Marcelino. Juan Manuel Owens MD: The scribe's documentation has been prepared under my direction and personally reviewed by me in its entirety.     SCRIBE ATTESTATION:  This note is prepared by Angelina Borja, acting as Scribe for Franci Castillo MD.    PROVIDER ATTESTATION:  Kathy Scott MD: The scribe's documentation has been prepared under my direction and personally reviewed by me in its entirety.  I confirm that the note above accurately reflects all work, treatment, procedures, and medical decision making performed by me.   _______________________________

## 2017-07-09 LAB
BACTERIA SPEC CULT: NORMAL
SERVICE CMNT-IMP: NORMAL

## 2017-07-10 LAB
C TRACH RRNA SPEC QL NAA+PROBE: NEGATIVE
N GONORRHOEA RRNA SPEC QL NAA+PROBE: NEGATIVE
SPECIMEN SOURCE: NORMAL

## 2018-03-06 ENCOUNTER — APPOINTMENT (OUTPATIENT)
Dept: CT IMAGING | Age: 33
End: 2018-03-06
Attending: NURSE PRACTITIONER
Payer: COMMERCIAL

## 2018-03-06 ENCOUNTER — HOSPITAL ENCOUNTER (EMERGENCY)
Age: 33
Discharge: HOME | End: 2018-03-06
Attending: EMERGENCY MEDICINE | Admitting: EMERGENCY MEDICINE
Payer: COMMERCIAL

## 2018-03-06 ENCOUNTER — APPOINTMENT (OUTPATIENT)
Dept: ULTRASOUND IMAGING | Age: 33
End: 2018-03-06
Attending: NURSE PRACTITIONER
Payer: COMMERCIAL

## 2018-03-06 VITALS
TEMPERATURE: 98.8 F | OXYGEN SATURATION: 100 % | HEIGHT: 67 IN | SYSTOLIC BLOOD PRESSURE: 124 MMHG | HEART RATE: 71 BPM | DIASTOLIC BLOOD PRESSURE: 94 MMHG | BODY MASS INDEX: 19.62 KG/M2 | RESPIRATION RATE: 17 BRPM | WEIGHT: 125 LBS

## 2018-03-06 DIAGNOSIS — N93.9 VAGINAL BLEEDING: ICD-10-CM

## 2018-03-06 DIAGNOSIS — R10.84 ABDOMINAL PAIN, GENERALIZED: Primary | ICD-10-CM

## 2018-03-06 DIAGNOSIS — N30.01 ACUTE CYSTITIS WITH HEMATURIA: ICD-10-CM

## 2018-03-06 LAB
ABO + RH BLD: NORMAL
ALBUMIN SERPL-MCNC: 3.6 G/DL (ref 3.4–5)
ALBUMIN/GLOB SERPL: 0.9 {RATIO} (ref 0.8–1.7)
ALP SERPL-CCNC: 62 U/L (ref 45–117)
ALT SERPL-CCNC: 15 U/L (ref 13–56)
ANION GAP SERPL CALC-SCNC: 10 MMOL/L (ref 3–18)
APPEARANCE UR: ABNORMAL
AST SERPL-CCNC: 11 U/L (ref 15–37)
BACTERIA URNS QL MICRO: ABNORMAL /HPF
BASOPHILS # BLD: 0 K/UL (ref 0–0.06)
BASOPHILS NFR BLD: 0 % (ref 0–2)
BILIRUB SERPL-MCNC: 0.3 MG/DL (ref 0.2–1)
BILIRUB UR QL: ABNORMAL
BLOOD GROUP ANTIBODIES SERPL: NORMAL
BUN SERPL-MCNC: 13 MG/DL (ref 7–18)
BUN/CREAT SERPL: 22 (ref 12–20)
CALCIUM SERPL-MCNC: 9.3 MG/DL (ref 8.5–10.1)
CHLORIDE SERPL-SCNC: 105 MMOL/L (ref 100–108)
CO2 SERPL-SCNC: 26 MMOL/L (ref 21–32)
COLOR UR: ABNORMAL
CREAT SERPL-MCNC: 0.58 MG/DL (ref 0.6–1.3)
DIFFERENTIAL METHOD BLD: ABNORMAL
EOSINOPHIL # BLD: 0.1 K/UL (ref 0–0.4)
EOSINOPHIL NFR BLD: 2 % (ref 0–5)
EPITH CASTS URNS QL MICRO: NEGATIVE /LPF (ref 0–5)
ERYTHROCYTE [DISTWIDTH] IN BLOOD BY AUTOMATED COUNT: 20.1 % (ref 11.6–14.5)
GLOBULIN SER CALC-MCNC: 3.8 G/DL (ref 2–4)
GLUCOSE SERPL-MCNC: 99 MG/DL (ref 74–99)
GLUCOSE UR STRIP.AUTO-MCNC: NEGATIVE MG/DL
HCG SERPL-ACNC: <1 MIU/ML (ref 1–6)
HCT VFR BLD AUTO: 34.3 % (ref 35–45)
HGB BLD-MCNC: 10 G/DL (ref 12–16)
HGB UR QL STRIP: ABNORMAL
KETONES UR QL STRIP.AUTO: NEGATIVE MG/DL
LEUKOCYTE ESTERASE UR QL STRIP.AUTO: ABNORMAL
LYMPHOCYTES # BLD: 0.9 K/UL (ref 0.9–3.6)
LYMPHOCYTES NFR BLD: 18 % (ref 21–52)
MCH RBC QN AUTO: 23.6 PG (ref 24–34)
MCHC RBC AUTO-ENTMCNC: 29.2 G/DL (ref 31–37)
MCV RBC AUTO: 81.1 FL (ref 74–97)
MONOCYTES # BLD: 0.6 K/UL (ref 0.05–1.2)
MONOCYTES NFR BLD: 12 % (ref 3–10)
NEUTS SEG # BLD: 3.4 K/UL (ref 1.8–8)
NEUTS SEG NFR BLD: 68 % (ref 40–73)
NITRITE UR QL STRIP.AUTO: POSITIVE
PH UR STRIP: 5 [PH] (ref 5–8)
PLATELET # BLD AUTO: 255 K/UL (ref 135–420)
PMV BLD AUTO: 10 FL (ref 9.2–11.8)
POTASSIUM SERPL-SCNC: 3.6 MMOL/L (ref 3.5–5.5)
PROT SERPL-MCNC: 7.4 G/DL (ref 6.4–8.2)
PROT UR STRIP-MCNC: 100 MG/DL
RBC # BLD AUTO: 4.23 M/UL (ref 4.2–5.3)
RBC #/AREA URNS HPF: ABNORMAL /HPF (ref 0–5)
SERVICE CMNT-IMP: NORMAL
SODIUM SERPL-SCNC: 141 MMOL/L (ref 136–145)
SP GR UR REFRACTOMETRY: >1.03 (ref 1–1.03)
SPECIMEN EXP DATE BLD: NORMAL
UROBILINOGEN UR QL STRIP.AUTO: 1 EU/DL (ref 0.2–1)
WBC # BLD AUTO: 5 K/UL (ref 4.6–13.2)
WBC URNS QL MICRO: ABNORMAL /HPF (ref 0–5)
WET PREP GENITAL: NORMAL

## 2018-03-06 PROCEDURE — 99285 EMERGENCY DEPT VISIT HI MDM: CPT

## 2018-03-06 PROCEDURE — 87210 SMEAR WET MOUNT SALINE/INK: CPT | Performed by: NURSE PRACTITIONER

## 2018-03-06 PROCEDURE — 84702 CHORIONIC GONADOTROPIN TEST: CPT | Performed by: NURSE PRACTITIONER

## 2018-03-06 PROCEDURE — 86900 BLOOD TYPING SEROLOGIC ABO: CPT | Performed by: NURSE PRACTITIONER

## 2018-03-06 PROCEDURE — 87086 URINE CULTURE/COLONY COUNT: CPT | Performed by: NURSE PRACTITIONER

## 2018-03-06 PROCEDURE — 81001 URINALYSIS AUTO W/SCOPE: CPT | Performed by: NURSE PRACTITIONER

## 2018-03-06 PROCEDURE — 76830 TRANSVAGINAL US NON-OB: CPT

## 2018-03-06 PROCEDURE — 85025 COMPLETE CBC W/AUTO DIFF WBC: CPT | Performed by: NURSE PRACTITIONER

## 2018-03-06 PROCEDURE — 74177 CT ABD & PELVIS W/CONTRAST: CPT

## 2018-03-06 PROCEDURE — 80053 COMPREHEN METABOLIC PANEL: CPT | Performed by: NURSE PRACTITIONER

## 2018-03-06 PROCEDURE — 74011636320 HC RX REV CODE- 636/320: Performed by: EMERGENCY MEDICINE

## 2018-03-06 PROCEDURE — 96376 TX/PRO/DX INJ SAME DRUG ADON: CPT

## 2018-03-06 PROCEDURE — 74011250636 HC RX REV CODE- 250/636: Performed by: NURSE PRACTITIONER

## 2018-03-06 PROCEDURE — 87491 CHLMYD TRACH DNA AMP PROBE: CPT | Performed by: EMERGENCY MEDICINE

## 2018-03-06 PROCEDURE — 96374 THER/PROPH/DIAG INJ IV PUSH: CPT

## 2018-03-06 RX ORDER — MORPHINE SULFATE 4 MG/ML
4 INJECTION INTRAVENOUS
Status: COMPLETED | OUTPATIENT
Start: 2018-03-06 | End: 2018-03-06

## 2018-03-06 RX ORDER — OXYCODONE AND ACETAMINOPHEN 5; 325 MG/1; MG/1
1 TABLET ORAL
Qty: 5 TAB | Refills: 0 | Status: SHIPPED | OUTPATIENT
Start: 2018-03-06

## 2018-03-06 RX ORDER — KETOROLAC TROMETHAMINE 10 MG/1
TABLET, FILM COATED ORAL
COMMUNITY

## 2018-03-06 RX ORDER — CEPHALEXIN 500 MG/1
500 CAPSULE ORAL 4 TIMES DAILY
Qty: 28 CAP | Refills: 0 | Status: SHIPPED | OUTPATIENT
Start: 2018-03-06 | End: 2018-03-13

## 2018-03-06 RX ADMIN — MORPHINE SULFATE 4 MG: 4 INJECTION INTRAVENOUS at 17:02

## 2018-03-06 RX ADMIN — IOPAMIDOL 100 ML: 612 INJECTION, SOLUTION INTRAVENOUS at 19:47

## 2018-03-06 RX ADMIN — MORPHINE SULFATE 4 MG: 4 INJECTION INTRAVENOUS at 19:03

## 2018-03-06 NOTE — ED TRIAGE NOTES
Patient with complaints of vaginal bleeding for one week that has become worse. Patient reports blood clots and soaking up to a pad an hour. Sepsis Screening completed    (  )Patient meets SIRS criteria. ( x )Patient does not meet SIRS criteria.       SIRS Criteria is achieved when two or more of the following are present   Temperature < 96.8°F (36°C) or > 100.9°F (38.3°C)   Heart Rate > 90 beats per minute   Respiratory Rate > 20 breaths per minute   WBC count > 12,000 or <4,000 or > 10% bands

## 2018-03-06 NOTE — ED NOTES
Pt alert and oriented. C/o vaginal bleeding x 1 week, worse today soaking at least 1 pad an hour today with \"alot of clots\" per pt. / pt c/o suprapubic pain. Left ovary and tube removed in 2016 from Ruptured cyst. Pt reports cycle started last week as normal 02/28/2018.

## 2018-03-06 NOTE — ED PROVIDER NOTES
EMERGENCY DEPARTMENT HISTORY AND PHYSICAL EXAM    Date: 3/6/2018  Patient Name: Janett Carrillo    History of Presenting Illness     Chief Complaint   Patient presents with    Vaginal Bleeding         History Provided By: Patient    Chief Complaint: abdominal pain  Duration: 2 Days  Timing:  Acute  Severity: 8 out of 10  Modifying Factors: Took Toradol for symptoms. Associated Symptoms: vaginal bleeding with clots    Additional History (Context):   3:30 PM  Janett Carrillo is a 28 y.o. female who presents to the emergency department C/O 8/10 abdominal pain onset 2 day ago. Associated symptoms include vaginal bleeding with blood clots. Took toradol for symptoms. Reports vaginal bleeding soaks one pad every hour. Pshx of unilateral salpingo-oophorectomy (left). Patient is not concerned for pregnancy. Last menstrual period 1 week ago. Pt denies any other Sx or complaints. PCP: None    Current Outpatient Prescriptions   Medication Sig Dispense Refill    ketorolac (TORADOL) 10 mg tablet Take  by mouth.  cephALEXin (KEFLEX) 500 mg capsule Take 1 Cap by mouth four (4) times daily for 7 days. 28 Cap 0    oxyCODONE-acetaminophen (PERCOCET) 5-325 mg per tablet Take 1 Tab by mouth every four (4) hours as needed for Pain. Max Daily Amount: 6 Tabs. 5 Tab 0       Past History     Past Medical History:  Past Medical History:   Diagnosis Date    Seizures ([address]        Past Surgical History:  Past Surgical History:   Procedure Laterality Date    HX GYN      tube and ovary removed on left side       Family History:  History reviewed. No pertinent family history. Social History:  Social History   Substance Use Topics    Smoking status: Current Every Day Smoker    Smokeless tobacco: Never Used    Alcohol use No       Allergies:  No Known Allergies      Review of Systems   Review of Systems   Gastrointestinal: Positive for abdominal pain. Genitourinary: Positive for vaginal bleeding.    All other systems reviewed and are negative. Physical Exam     Vitals:    03/06/18 1630 03/06/18 1702 03/06/18 1814 03/06/18 1903   BP: 128/85  129/80 (!) 124/94   Pulse:  98 89 71   Resp:  18 18 17   Temp:   98.8 °F (37.1 °C) 98.8 °F (37.1 °C)   SpO2:  100% 100% 100%   Weight:       Height:         Physical Exam   Constitutional: She is oriented to person, place, and time. Patient appears moderately uncomfortable, no signs of acute distress   HENT:   Head: Normocephalic. Eyes: Conjunctivae are normal.   Neck: Normal range of motion. Cardiovascular: Normal rate and regular rhythm. Pulmonary/Chest: Effort normal and breath sounds normal.   Abdominal: Soft. Bowel sounds are normal. There is tenderness. Tenderness noted b/l lower quadrants    Genitourinary:         Musculoskeletal: Normal range of motion. Neurological: She is alert and oriented to person, place, and time. Skin: Skin is warm and dry. Will check basic labs to evaluate for any signs of infection, anemia, pregnancy. Will perform pelvic to evaluate amount of bleeding and collect cultures. Patient given Morphine.  Will obtain ultrasound due to abnormal intrauterine bleeding      Diagnostic Study Results     Labs -     Recent Results (from the past 12 hour(s))   URINALYSIS W/ RFLX MICROSCOPIC    Collection Time: 03/06/18  3:30 PM   Result Value Ref Range    Color BLOODY      Appearance TURBID      Specific gravity >1.030 (H) 1.005 - 1.030    pH (UA) 5.0 5.0 - 8.0      Protein 100 (A) NEG mg/dL    Glucose NEGATIVE  NEG mg/dL    Ketone NEGATIVE  NEG mg/dL    Bilirubin SMALL (A) NEG      Blood LARGE (A) NEG      Urobilinogen 1.0 0.2 - 1.0 EU/dL    Nitrites POSITIVE (A) NEG      Leukocyte Esterase MODERATE (A) NEG     TYPE & SCREEN    Collection Time: 03/06/18  3:30 PM   Result Value Ref Range    Crossmatch Expiration 03/09/2018     ABO/Rh(D) A POSITIVE     Antibody screen NEG    URINE MICROSCOPIC ONLY    Collection Time: 03/06/18  3:30 PM   Result Value Ref Range WBC 0 to 1 0 - 5 /hpf    RBC TOO NUMEROUS TO COUNT 0 - 5 /hpf    Epithelial cells NEGATIVE  0 - 5 /lpf    Bacteria FEW (A) NEG /hpf   WET PREP    Collection Time: 03/06/18  4:15 PM   Result Value Ref Range    Special Requests: NO SPECIAL REQUESTS      Wet prep NO YEAST,TRICHOMONAS OR CLUE CELLS NOTED     CBC WITH AUTOMATED DIFF    Collection Time: 03/06/18  4:45 PM   Result Value Ref Range    WBC 5.0 4.6 - 13.2 K/uL    RBC 4.23 4.20 - 5.30 M/uL    HGB 10.0 (L) 12.0 - 16.0 g/dL    HCT 34.3 (L) 35.0 - 45.0 %    MCV 81.1 74.0 - 97.0 FL    MCH 23.6 (L) 24.0 - 34.0 PG    MCHC 29.2 (L) 31.0 - 37.0 g/dL    RDW 20.1 (H) 11.6 - 14.5 %    PLATELET 087 139 - 720 K/uL    MPV 10.0 9.2 - 11.8 FL    NEUTROPHILS 68 40 - 73 %    LYMPHOCYTES 18 (L) 21 - 52 %    MONOCYTES 12 (H) 3 - 10 %    EOSINOPHILS 2 0 - 5 %    BASOPHILS 0 0 - 2 %    ABS. NEUTROPHILS 3.4 1.8 - 8.0 K/UL    ABS. LYMPHOCYTES 0.9 0.9 - 3.6 K/UL    ABS. MONOCYTES 0.6 0.05 - 1.2 K/UL    ABS. EOSINOPHILS 0.1 0.0 - 0.4 K/UL    ABS. BASOPHILS 0.0 0.0 - 0.06 K/UL    DF AUTOMATED     METABOLIC PANEL, COMPREHENSIVE    Collection Time: 03/06/18  4:45 PM   Result Value Ref Range    Sodium 141 136 - 145 mmol/L    Potassium 3.6 3.5 - 5.5 mmol/L    Chloride 105 100 - 108 mmol/L    CO2 26 21 - 32 mmol/L    Anion gap 10 3.0 - 18 mmol/L    Glucose 99 74 - 99 mg/dL    BUN 13 7.0 - 18 MG/DL    Creatinine 0.58 (L) 0.6 - 1.3 MG/DL    BUN/Creatinine ratio 22 (H) 12 - 20      GFR est AA >60 >60 ml/min/1.73m2    GFR est non-AA >60 >60 ml/min/1.73m2    Calcium 9.3 8.5 - 10.1 MG/DL    Bilirubin, total 0.3 0.2 - 1.0 MG/DL    ALT (SGPT) 15 13 - 56 U/L    AST (SGOT) 11 (L) 15 - 37 U/L    Alk.  phosphatase 62 45 - 117 U/L    Protein, total 7.4 6.4 - 8.2 g/dL    Albumin 3.6 3.4 - 5.0 g/dL    Globulin 3.8 2.0 - 4.0 g/dL    A-G Ratio 0.9 0.8 - 1.7     BETA HCG, QT    Collection Time: 03/06/18  4:45 PM   Result Value Ref Range    Beta HCG, QT <1 (L) 1.0 - 6.0 MIU/ML       Radiologic Studies -     CT ABD PELV W CONT   Final Result   IMPRESSION:     1. Uterus is markedly heterogeneous in appearance of unclear etiology. Pelvic  ultrasound advised for further evaluation to better characterize the uterus.     Probable cyst in the upper pole the left kidney with milk of calcium layering in  It. As read by the radiologist.   Destiney Maloney   Final Result   IMPRESSION:     Markedly heterogeneous appearing uterus with areas of shadowing which raises the  possibility of adenomyosis. Consider MRI for further evaluation.     2 probable fibroids in the uterus     Endometrial stripe is difficult to visualize and accurately measure. Again MRI  would be of help in evaluating the thickness of the endometrium to exclude  adenomyosis.     No torsion the right ovary     Left oophorectomy     As read by the radiologist.       CT Results  (Last 48 hours)    None        CXR Results  (Last 48 hours)    None          Medications given in the ED-  Medications   morphine 4 mg (4 mg IntraVENous Given 3/6/18 1702)   morphine 4 mg (4 mg IntraVENous Given 3/6/18 1903)   iopamidol (ISOVUE 300) 61 % contrast injection 100 mL (100 mL IntraVENous Given 3/6/18 1947)         Medical Decision Making   I am the first provider for this patient. I reviewed the vital signs, available nursing notes, past medical history, past surgical history, family history and social history. Vital Signs-Reviewed the patient's vital signs. Pulse Oximetry Analysis - 100% on RA     Records Reviewed: Nursing Notes and Old Medical Records    Provider Notes (Medical Decision Making):     Procedures:  Pelvic Exam  Date/Time: 3/6/2018 4:11 PM  Performed by: NP  Procedure duration:  5 minutes. Documented by:  Param Lozano. As dictated by:  Shagufta Spear RN  Exam assisted by:  William Smith ED Tech . Type of exam performed: bimanual and speculum. External genitalia appearance: normal.    Vaginal exam:  bleeding.     Bleeding: mild  Cervical exam:  os closed and no cervical motion tenderness. Specimen(s) collected:  GC and vaginal culture. Bimanual exam:  left adenexal tenderness and right adenexal tenderness. Patient tolerance: Patient tolerated the procedure well with no immediate complications          ED Course:   3:30 PM Initial assessment performed. The patients presenting problems have been discussed, and they are in agreement with the care plan formulated and outlined with them. I have encouraged them to ask questions as they arise throughout their visit. 9:20 PM Pt updated on US, CT scan, and OBGYN conversation. Strict return precaution provided. Understand to follow up with [facility] tomorrow. Offering no questions or concerns at time. Patient states vaginal bleeding has decrease since being in ED. CONSULT NOTE:   7:12 PM  Reginald Avendaño NP spoke with Juanjo Pérez MD   Specialty: ED attending  Discussed pt's hx, disposition, and available diagnostic and imaging results. Consulting physician recommends CT of abdomen. CONSULT NOTE:   8:57 PM  Reginald Avendaño NP spoke with Devan Aden   Specialty: Midwife   Discussed pt's hx, disposition, and available diagnostic and imaging results. Consulting physician recommend discharge with pain medication and have her go next door to the [facility] for scheduled appointment tomorrow at 1pm.     Diagnosis and Disposition       DISCHARGE NOTE:  9:21 PM  Gale Montero's  results have been reviewed with her. She has been counseled regarding her diagnosis, treatment, and plan. She verbally conveys understanding and agreement of the signs, symptoms, diagnosis, treatment and prognosis and additionally agrees to follow up as discussed. She also agrees with the care-plan and conveys that all of her questions have been answered.   I have also provided discharge instructions for her that include: educational information regarding their diagnosis and treatment, and list of reasons why they would want to return to the ED prior to their follow-up appointment, should her condition change. She has been provided with education for proper emergency department utilization. CLINICAL IMPRESSION:    1. Abdominal pain, generalized    2. Vaginal bleeding    3. Acute cystitis with hematuria        PLAN:  1. D/C Home  2. Current Discharge Medication List      START taking these medications    Details   cephALEXin (KEFLEX) 500 mg capsule Take 1 Cap by mouth four (4) times daily for 7 days. Qty: 28 Cap, Refills: 0    Associated Diagnoses: Abdominal pain, generalized      oxyCODONE-acetaminophen (PERCOCET) 5-325 mg per tablet Take 1 Tab by mouth every four (4) hours as needed for Pain. Max Daily Amount: 6 Tabs. Qty: 5 Tab, Refills: 0    Associated Diagnoses: Abdominal pain, generalized           3. Follow-up Information     Follow up With Details Comments Contact Info     Go in 1 day For follow up with [address] [facility] [address] as scheduled at [address], [address] [facility]   [phone]    [facility] EMERGENCY DEPT Go to As needed, as symptoms worsen [address] [place] [address]  [phone]        _______________________________    Attestations: This note is prepared by Veronica Churchill, acting as Scribe for Stacie Paula NP. Stacie Paula NP:  The scribe's documentation has been prepared under my direction and personally reviewed by me in its entirety.   I confirm that the note above accurately reflects all work, treatment, procedures, and medical decision making performed by me.  _______________________________

## 2018-03-07 NOTE — DISCHARGE INSTRUCTIONS
Urinary Tract Infection in Women: Care Instructions  Your Care Instructions    A urinary tract infection, or UTI, is a general term for an infection anywhere between the kidneys and the urethra (where urine comes out). Most UTIs are bladder infections. They often cause pain or burning when you urinate. UTIs are caused by bacteria and can be cured with antibiotics. Be sure to complete your treatment so that the infection goes away. Follow-up care is a key part of your treatment and safety. Be sure to make and go to all appointments, and call your doctor if you are having problems. It's also a good idea to know your test results and keep a list of the medicines you take. How can you care for yourself at home? · Take your antibiotics as directed. Do not stop taking them just because you feel better. You need to take the full course of antibiotics. · Drink extra water and other fluids for the next day or two. This may help wash out the bacteria that are causing the infection. (If you have kidney, heart, or liver disease and have to limit fluids, talk with your doctor before you increase your fluid intake.)  · Avoid drinks that are carbonated or have caffeine. They can irritate the bladder. · Urinate often. Try to empty your bladder each time. · To relieve pain, take a hot bath or lay a heating pad set on low over your lower belly or genital area. Never go to sleep with a heating pad in place. To prevent UTIs  · Drink plenty of water each day. This helps you urinate often, which clears bacteria from your system. (If you have kidney, heart, or liver disease and have to limit fluids, talk with your doctor before you increase your fluid intake.)  · Urinate when you need to. · Urinate right after you have sex. · Change sanitary pads often. · Avoid douches, bubble baths, feminine hygiene sprays, and other feminine hygiene products that have deodorants.   · After going to the bathroom, wipe from front to back.  When should you call for help? Call your doctor now or seek immediate medical care if:  ? · Symptoms such as fever, chills, nausea, or vomiting get worse or appear for the first time. ? · You have new pain in your back just below your rib cage. This is called flank pain. ? · There is new blood or pus in your urine. ? · You have any problems with your antibiotic medicine. ? Watch closely for changes in your health, and be sure to contact your doctor if:  ? · You are not getting better after taking an antibiotic for 2 days. ? · Your symptoms go away but then come back. Where can you learn more? Go to http://joanne-yana.info/. Enter G673 in the search box to learn more about \"Urinary Tract Infection in Women: Care Instructions. \"  Current as of: May 12, 2017  Content Version: 11.4  © 1506-9535 [employer]. Care instructions adapted under license by [employer] (which disclaims liability or warranty for this information). If you have questions about a medical condition or this instruction, always ask your healthcare professional. [address] any warranty or liability for your use of this information. Abdominal Pain: Care Instructions  Your Care Instructions    Abdominal pain has many possible causes. Some aren't serious and get better on their own in a few days. Others need more testing and treatment. If your pain continues or gets worse, you need to be rechecked and may need more tests to find out what is wrong. You may need surgery to correct the problem. Don't ignore new symptoms, such as fever, nausea and vomiting, urination problems, pain that gets worse, and dizziness. These may be signs of a more serious problem. Your doctor may have recommended a follow-up visit in the next 8 to 12 hours. If you are not getting better, you may need more tests or treatment.   The doctor has checked you carefully, but problems can develop later. If you notice any problems or new symptoms, get medical treatment right away. Follow-up care is a key part of your treatment and safety. Be sure to make and go to all appointments, and call your doctor if you are having problems. It's also a good idea to know your test results and keep a list of the medicines you take. How can you care for yourself at home? · Rest until you feel better. · To prevent dehydration, drink plenty of fluids, enough so that your urine is light yellow or clear like water. Choose water and other caffeine-free clear liquids until you feel better. If you have kidney, heart, or liver disease and have to limit fluids, talk with your doctor before you increase the amount of fluids you drink. · If your stomach is upset, eat mild foods, such as rice, dry toast or crackers, bananas, and applesauce. Try eating several small meals instead of two or three large ones. · Wait until 48 hours after all symptoms have gone away before you have spicy foods, alcohol, and drinks that contain caffeine. · Do not eat foods that are high in fat. · Avoid anti-inflammatory medicines such as aspirin, ibuprofen (Advil, Motrin), and naproxen (Aleve). These can cause stomach upset. Talk to your doctor if you take daily aspirin for another health problem. When should you call for help? Call 911 anytime you think you may need emergency care. For example, call if:  ? · You passed out (lost consciousness). ? · You pass maroon or very bloody stools. ? · You vomit blood or what looks like coffee grounds. ? · You have new, severe belly pain. ?Call your doctor now or seek immediate medical care if:  ? · Your pain gets worse, especially if it becomes focused in one area of your belly. ? · You have a new or higher fever. ? · Your stools are black and look like tar, or they have streaks of blood. ? · You have unexpected vaginal bleeding. ? · You have symptoms of a urinary tract infection.  These may include:  ¨ Pain when you urinate. ¨ Urinating more often than usual.  ¨ Blood in your urine. ? · You are dizzy or lightheaded, or you feel like you may faint. ? Watch closely for changes in your health, and be sure to contact your doctor if:  ? · You are not getting better after 1 day (24 hours). Where can you learn more? Go to http://joanne-yana.info/. Enter P596 in the search box to learn more about \"Abdominal Pain: Care Instructions. \"  Current as of: March 20, 2017  Content Version: 11.4  © 3936-4660 [employer]. Care instructions adapted under license by [employer] (which disclaims liability or warranty for this information). If you have questions about a medical condition or this instruction, always ask your healthcare professional. [address] any warranty or liability for your use of this information.

## 2018-03-08 LAB
BACTERIA SPEC CULT: NORMAL
SERVICE CMNT-IMP: NORMAL

## 2019-03-04 ENCOUNTER — APPOINTMENT (OUTPATIENT)
Dept: CT IMAGING | Age: 34
End: 2019-03-04
Attending: PHYSICIAN ASSISTANT
Payer: COMMERCIAL

## 2019-03-04 ENCOUNTER — HOSPITAL ENCOUNTER (EMERGENCY)
Age: 34
Discharge: ANOTHER HOSPITAL | End: 2019-03-04
Attending: EMERGENCY MEDICINE
Payer: COMMERCIAL

## 2019-03-04 VITALS
HEART RATE: 63 BPM | SYSTOLIC BLOOD PRESSURE: 116 MMHG | HEIGHT: 66 IN | TEMPERATURE: 99.1 F | BODY MASS INDEX: 20.09 KG/M2 | DIASTOLIC BLOOD PRESSURE: 69 MMHG | WEIGHT: 125 LBS | RESPIRATION RATE: 17 BRPM | OXYGEN SATURATION: 100 %

## 2019-03-04 DIAGNOSIS — F12.90 MARIJUANA USE: ICD-10-CM

## 2019-03-04 DIAGNOSIS — D25.9 UTERINE LEIOMYOMA, UNSPECIFIED LOCATION: ICD-10-CM

## 2019-03-04 DIAGNOSIS — I82.220 INFERIOR VENA CAVA THROMBOEMBOLISM (HCC): Primary | ICD-10-CM

## 2019-03-04 LAB
ALBUMIN SERPL-MCNC: 4.2 G/DL (ref 3.4–5)
ALBUMIN/GLOB SERPL: 1.2 {RATIO} (ref 0.8–1.7)
ALP SERPL-CCNC: 88 U/L (ref 45–117)
ALT SERPL-CCNC: 18 U/L (ref 13–56)
AMPHET UR QL SCN: NEGATIVE
ANION GAP SERPL CALC-SCNC: 10 MMOL/L (ref 3–18)
APPEARANCE UR: ABNORMAL
APTT PPP: 26.4 SEC (ref 23–36.4)
AST SERPL-CCNC: 17 U/L (ref 15–37)
BACTERIA URNS QL MICRO: ABNORMAL /HPF
BARBITURATES UR QL SCN: NEGATIVE
BASOPHILS # BLD: 0 K/UL (ref 0–0.1)
BASOPHILS NFR BLD: 0 % (ref 0–2)
BENZODIAZ UR QL: NEGATIVE
BILIRUB SERPL-MCNC: 0.7 MG/DL (ref 0.2–1)
BILIRUB UR QL: NEGATIVE
BUN SERPL-MCNC: 8 MG/DL (ref 7–18)
BUN/CREAT SERPL: 14 (ref 12–20)
CALCIUM SERPL-MCNC: 9.2 MG/DL (ref 8.5–10.1)
CANNABINOIDS UR QL SCN: POSITIVE
CHLORIDE SERPL-SCNC: 102 MMOL/L (ref 100–108)
CO2 SERPL-SCNC: 27 MMOL/L (ref 21–32)
COCAINE UR QL SCN: NEGATIVE
COLOR UR: ABNORMAL
CREAT SERPL-MCNC: 0.59 MG/DL (ref 0.6–1.3)
DIFFERENTIAL METHOD BLD: ABNORMAL
EOSINOPHIL # BLD: 0 K/UL (ref 0–0.4)
EOSINOPHIL NFR BLD: 0 % (ref 0–5)
EPITH CASTS URNS QL MICRO: ABNORMAL /LPF (ref 0–5)
ERYTHROCYTE [DISTWIDTH] IN BLOOD BY AUTOMATED COUNT: 13.7 % (ref 11.6–14.5)
ETHANOL SERPL-MCNC: <3 MG/DL (ref 0–3)
GLOBULIN SER CALC-MCNC: 3.4 G/DL (ref 2–4)
GLUCOSE SERPL-MCNC: 101 MG/DL (ref 74–99)
GLUCOSE UR STRIP.AUTO-MCNC: NEGATIVE MG/DL
HCG SERPL QL: NEGATIVE
HCG UR QL: NEGATIVE
HCT VFR BLD AUTO: 43.4 % (ref 35–45)
HDSCOM,HDSCOM: ABNORMAL
HGB BLD-MCNC: 14 G/DL (ref 12–16)
HGB UR QL STRIP: ABNORMAL
INR PPP: 1 (ref 0.8–1.2)
KETONES UR QL STRIP.AUTO: 80 MG/DL
LEUKOCYTE ESTERASE UR QL STRIP.AUTO: ABNORMAL
LIPASE SERPL-CCNC: 87 U/L (ref 73–393)
LYMPHOCYTES # BLD: 0.8 K/UL (ref 0.9–3.6)
LYMPHOCYTES NFR BLD: 11 % (ref 21–52)
MAGNESIUM SERPL-MCNC: 2.5 MG/DL (ref 1.6–2.6)
MCH RBC QN AUTO: 31.5 PG (ref 24–34)
MCHC RBC AUTO-ENTMCNC: 32.3 G/DL (ref 31–37)
MCV RBC AUTO: 97.7 FL (ref 74–97)
METHADONE UR QL: NEGATIVE
MONOCYTES # BLD: 0.4 K/UL (ref 0.05–1.2)
MONOCYTES NFR BLD: 5 % (ref 3–10)
MUCOUS THREADS URNS QL MICRO: ABNORMAL /LPF
NEUTS SEG # BLD: 6.3 K/UL (ref 1.8–8)
NEUTS SEG NFR BLD: 84 % (ref 40–73)
NITRITE UR QL STRIP.AUTO: NEGATIVE
OPIATES UR QL: NEGATIVE
PCP UR QL: NEGATIVE
PH UR STRIP: 7.5 [PH] (ref 5–8)
PLATELET # BLD AUTO: 249 K/UL (ref 135–420)
PMV BLD AUTO: 10 FL (ref 9.2–11.8)
POTASSIUM SERPL-SCNC: 3 MMOL/L (ref 3.5–5.5)
PROT SERPL-MCNC: 7.6 G/DL (ref 6.4–8.2)
PROT UR STRIP-MCNC: NEGATIVE MG/DL
PROTHROMBIN TIME: 12.4 SEC (ref 11.5–15.2)
RBC # BLD AUTO: 4.44 M/UL (ref 4.2–5.3)
RBC #/AREA URNS HPF: ABNORMAL /HPF (ref 0–5)
SODIUM SERPL-SCNC: 139 MMOL/L (ref 136–145)
SP GR UR REFRACTOMETRY: 1.01 (ref 1–1.03)
UROBILINOGEN UR QL STRIP.AUTO: 1 EU/DL (ref 0.2–1)
WBC # BLD AUTO: 7.5 K/UL (ref 4.6–13.2)
WBC URNS QL MICRO: ABNORMAL /HPF (ref 0–5)

## 2019-03-04 PROCEDURE — 96372 THER/PROPH/DIAG INJ SC/IM: CPT

## 2019-03-04 PROCEDURE — 81001 URINALYSIS AUTO W/SCOPE: CPT

## 2019-03-04 PROCEDURE — 85730 THROMBOPLASTIN TIME PARTIAL: CPT

## 2019-03-04 PROCEDURE — 99285 EMERGENCY DEPT VISIT HI MDM: CPT

## 2019-03-04 PROCEDURE — 96375 TX/PRO/DX INJ NEW DRUG ADDON: CPT

## 2019-03-04 PROCEDURE — 85025 COMPLETE CBC W/AUTO DIFF WBC: CPT

## 2019-03-04 PROCEDURE — 85610 PROTHROMBIN TIME: CPT

## 2019-03-04 PROCEDURE — 81025 URINE PREGNANCY TEST: CPT

## 2019-03-04 PROCEDURE — C9113 INJ PANTOPRAZOLE SODIUM, VIA: HCPCS | Performed by: PHYSICIAN ASSISTANT

## 2019-03-04 PROCEDURE — 96374 THER/PROPH/DIAG INJ IV PUSH: CPT

## 2019-03-04 PROCEDURE — 83735 ASSAY OF MAGNESIUM: CPT

## 2019-03-04 PROCEDURE — 80307 DRUG TEST PRSMV CHEM ANLYZR: CPT

## 2019-03-04 PROCEDURE — 74011250637 HC RX REV CODE- 250/637: Performed by: PHYSICIAN ASSISTANT

## 2019-03-04 PROCEDURE — 74011636320 HC RX REV CODE- 636/320: Performed by: EMERGENCY MEDICINE

## 2019-03-04 PROCEDURE — 74011250636 HC RX REV CODE- 250/636: Performed by: PHYSICIAN ASSISTANT

## 2019-03-04 PROCEDURE — 96376 TX/PRO/DX INJ SAME DRUG ADON: CPT

## 2019-03-04 PROCEDURE — 83690 ASSAY OF LIPASE: CPT

## 2019-03-04 PROCEDURE — 74177 CT ABD & PELVIS W/CONTRAST: CPT

## 2019-03-04 PROCEDURE — 80053 COMPREHEN METABOLIC PANEL: CPT

## 2019-03-04 PROCEDURE — 84703 CHORIONIC GONADOTROPIN ASSAY: CPT

## 2019-03-04 RX ORDER — ENOXAPARIN SODIUM 100 MG/ML
1 INJECTION SUBCUTANEOUS
Status: COMPLETED | OUTPATIENT
Start: 2019-03-04 | End: 2019-03-04

## 2019-03-04 RX ORDER — ONDANSETRON 2 MG/ML
4 INJECTION INTRAMUSCULAR; INTRAVENOUS
Status: COMPLETED | OUTPATIENT
Start: 2019-03-04 | End: 2019-03-04

## 2019-03-04 RX ORDER — PANTOPRAZOLE SODIUM 40 MG/10ML
40 INJECTION, POWDER, LYOPHILIZED, FOR SOLUTION INTRAVENOUS
Status: COMPLETED | OUTPATIENT
Start: 2019-03-04 | End: 2019-03-04

## 2019-03-04 RX ORDER — DICYCLOMINE HYDROCHLORIDE 10 MG/ML
20 INJECTION INTRAMUSCULAR ONCE
Status: COMPLETED | OUTPATIENT
Start: 2019-03-04 | End: 2019-03-04

## 2019-03-04 RX ORDER — MORPHINE SULFATE 4 MG/ML
4 INJECTION INTRAVENOUS
Status: COMPLETED | OUTPATIENT
Start: 2019-03-04 | End: 2019-03-04

## 2019-03-04 RX ADMIN — MORPHINE SULFATE 4 MG: 4 INJECTION INTRAVENOUS at 22:04

## 2019-03-04 RX ADMIN — ENOXAPARIN SODIUM 60 MG: 60 INJECTION SUBCUTANEOUS at 22:00

## 2019-03-04 RX ADMIN — DICYCLOMINE HYDROCHLORIDE 20 MG: 20 INJECTION, SOLUTION INTRAMUSCULAR at 20:15

## 2019-03-04 RX ADMIN — POTASSIUM BICARBONATE 50 MEQ: 25 TABLET, EFFERVESCENT ORAL at 20:13

## 2019-03-04 RX ADMIN — SODIUM CHLORIDE 1000 ML: 900 INJECTION, SOLUTION INTRAVENOUS at 17:56

## 2019-03-04 RX ADMIN — ONDANSETRON 4 MG: 2 INJECTION INTRAMUSCULAR; INTRAVENOUS at 17:56

## 2019-03-04 RX ADMIN — IOPAMIDOL 100 ML: 612 INJECTION, SOLUTION INTRAVENOUS at 19:56

## 2019-03-04 RX ADMIN — PANTOPRAZOLE SODIUM 40 MG: 40 INJECTION, POWDER, FOR SOLUTION INTRAVENOUS at 17:56

## 2019-03-04 RX ADMIN — ONDANSETRON 4 MG: 2 INJECTION INTRAMUSCULAR; INTRAVENOUS at 22:02

## 2019-03-04 RX ADMIN — ONDANSETRON 4 MG: 2 INJECTION INTRAMUSCULAR; INTRAVENOUS at 20:13

## 2019-03-04 NOTE — ED TRIAGE NOTES
Patient arrives c/o vomiting x3 days. Patient reports she was seen at [facility] yesterday given zofran and phenergran. She states they aren't working. She states she just came from [facility] today, they tried to draw blood but were unsuccessful and told her to come to the ED.

## 2019-03-04 NOTE — ED PROVIDER NOTES
EMERGENCY DEPARTMENT HISTORY AND PHYSICAL EXAM 
 
Date: 3/4/2019 Patient Name: Webster Litten History of Presenting Illness Chief Complaint Patient presents with  Vomiting History Provided By: Patient Chief Complaint: N/V Duration: 3 Days Timing:  Intermittent Location: GI Severity: Mild Modifying Factors: No relief with Phenergan or Zofran Associated Symptoms: burning generalized abd pain secondary to vomiting, CP secondary to vomiting, diarrhea, hot flashes, and chill Additional History (Context):  
4:54 PM 
Webster Litten is a 27 y.o. female with PSHX of endoscopy and PMHx of recurrent vomiting episodes who presents to the emergency department C/O N/V, onset 3 days ago. Associated sxs include burning generalized abd pain secondary to vomiting, CP secondary to vomiting, diarrhea, hot flashes, and chills. Pt states that sxs onset 3 days ago after eating steak for dinner. Pt was previously seen at [facility] yesterday for vomiting. Blood work was collected. Pt was discharged with a diagnosis of viral infection and prescribed to Toradol for abd pain and Phenergan and Zofran for N/V. Pt states she sxs began to resolve yesterday, but onset again this morning with no relief from prescribed medications. Pt returned to [facility] today and was advised to be evaluated in the ED for dehydration. Pt endorses hx of recurrent episodes of vomiting with the last episode being 3 months ago. Pt notes that her episodes typically last 1 day with no associated abd pain or CP. Pt also endorses hx of chronic cramping abd pain secondary to her menstrual cycle and pain is typically resolved with Toradol; however, pt states current abd pain is unlike chronic sxs. Pt started her menstrual period yesterday. Endorses SHx of smoking tobacco use (3 packs per week).  Pt denies fevers, sick contacts, abnormal food intake, hx of similar vomiting episodes, SHx of alcohol use or illicit drug use, daily use of antacids, and any other sxs or complaints. PCP: None Current Outpatient Medications Medication Sig Dispense Refill  ketorolac (TORADOL) 10 mg tablet Take  by mouth.  oxyCODONE-acetaminophen (PERCOCET) 5-325 mg per tablet Take 1 Tab by mouth every four (4) hours as needed for Pain. Max Daily Amount: 6 Tabs. 5 Tab 0 Past History Past Medical History: 
Past Medical History:  
Diagnosis Date  Seizures ([address] Past Surgical History: 
Past Surgical History:  
Procedure Laterality Date  HX GYN    
 tube and ovary removed on left side Family History: 
History reviewed. No pertinent family history. Social History: 
Social History Tobacco Use  Smoking status: Current Every Day Smoker Packs/day: 0.50  Smokeless tobacco: Never Used Substance Use Topics  Alcohol use: No  
 Drug use: No  
 
 
Allergies: 
No Known Allergies Review of Systems Review of Systems Constitutional: Positive for chills. Negative for fever.  
     (+) Hot flashes Respiratory: Negative for cough and shortness of breath. Cardiovascular: Positive for chest pain (burning). Negative for palpitations and leg swelling. Gastrointestinal: Positive for abdominal pain, diarrhea, nausea and vomiting. Genitourinary: Positive for vaginal bleeding (on menses). Negative for dysuria, flank pain, frequency and urgency. Musculoskeletal: Negative for back pain. Neurological: Negative for dizziness, light-headedness and headaches. Hematological: Does not bruise/bleed easily. All other systems reviewed and are negative. Physical Exam  
 
Vitals:  
 03/04/19 2019 03/04/19 2200 03/04/19 2209 03/04/19 2230 BP: 124/71 148/89 130/88 116/69 Pulse:  77 94 63 Resp:  14 19 17 Temp:   99.1 °F (37.3 °C) SpO2: 100% 100% 100% 100% Weight:      
Height:      
 
Physical Exam  
Constitutional: She is oriented to person, place, and time.  She appears well-developed and well-nourished. No distress. AA female in NAD. Alert. Appears uncomfortable at times HENT:  
Head: Normocephalic and atraumatic. Right Ear: External ear normal.  
Left Ear: External ear normal.  
Nose: Nose normal.  
Mouth/Throat: Uvula is midline, oropharynx is clear and moist and mucous membranes are normal.  
Eyes: Conjunctivae are normal.  
Neck: Normal range of motion. Neck supple. Cardiovascular: Normal rate, regular rhythm, normal heart sounds and intact distal pulses. Exam reveals no gallop and no friction rub. No murmur heard. Pulmonary/Chest: Effort normal and breath sounds normal. No accessory muscle usage. No tachypnea. No respiratory distress. She has no decreased breath sounds. She has no wheezes. She has no rhonchi. She has no rales. Abdominal: Soft. Normal appearance. She exhibits no ascites and no mass. There is generalized tenderness. There is rebound. There is no rigidity, no guarding, no CVA tenderness and no tenderness at McBurney's point. Musculoskeletal: Normal range of motion. She exhibits no edema. Neurological: She is alert and oriented to person, place, and time. Skin: Skin is warm and dry. No rash noted. She is not diaphoretic. No erythema. Psychiatric: She has a normal mood and affect. Judgment normal.  
Nursing note and vitals reviewed. Diagnostic Study Results Labs - No results found for this or any previous visit (from the past 12 hour(s)). Radiologic Studies -  
CT ABD PELV W CONT Final Result IMPRESSION:  
  
CRITICAL RESULT:  
  
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  
iliac artery extending over 10 cm in length. I informed JAKE Harris 8:45 PM March 4, 2019. Large uterine probable fibroid. Advise pelvic ultrasound on a nonemergent basis  
for further evaluation. Left renal cyst with milk of calcium. Small hiatal hernia As read by the radiologist.  
 
 CT Results  (Last 48 hours) 03/04/19 2008  CT ABD PELV W CONT Final result Impression:  IMPRESSION:  
   
CRITICAL RESULT:  
   
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  
iliac artery extending over 10 cm in length. I informed PA Kimberly 8:45 PM March 4, 2019. Large uterine probable fibroid. Advise pelvic ultrasound on a nonemergent basis  
for further evaluation. Left renal cyst with milk of calcium. Small hiatal hernia Narrative:  EXAM: CT of the abdomen and pelvis INDICATION: Generalized abdominal pain COMPARISON: March 6, 2018 TECHNIQUE: Axial CT imaging of the abdomen and pelvis was performed with  
intravenous contrast. Multiplanar reformats were generated. One or more dose  
reduction techniques were used on this CT: automated exposure control,  
adjustment of the mAs and/or kVp according to patient size, and iterative  
reconstruction techniques. The specific techniques used on this CT exam have  
been documented in the patient's electronic medical record. Digital Imaging and  
Communications in Medicine (DICOM) format image data are available to  
nonaffiliated external healthcare facilities or entities on a secure, media  
free, reciprocally searchable basis with patient authorization for at least a  
12-month period after this study. _______________ FINDINGS:  
   
LOWER CHEST: Unremarkable. LIVER, BILIARY: Liver is normal. No biliary dilation. Gallbladder is  
unremarkable. PANCREAS: There is mild pancreatic duct dilatation measuring up to 3 mm. SPLEEN: Normal.  
   
ADRENALS: Normal.  
   
KIDNEYS: Left kidney demonstrates an 18 mm cystic structure in the upper pole  
with high density layering material suggesting milk of calcium in cyst. Left  
kidney is otherwise unremarkable. Right kidney is unremarkable. VASCULATURE: There is a thrombus in the inferior vena cava extending to the  
right common iliac vein. Aorta is normal in caliber. LYMPH NODES: No enlarged lymph nodes. GASTROINTESTINAL TRACT: No bowel dilation or wall thickening. Appendix is  
normal. There is no bowel obstruction. There is a small umbilical hernia with  
fat content. There is a small hiatal hernia. PELVIC ORGANS: Uterus is enlarged with suggestion of a 6 cm enlarged fundal  
fibroid. This can better be evaluated with ultrasound on a nonemergent basis. There is a 2.5 cm right adnexal cyst. Urinary bladder is under distended  
therefore difficult to evaluate. There is no free fluid. BONES: No acute or aggressive osseous abnormalities identified. Degenerative  
disc disease present at L5-S1.  
   
OTHER: None.  
   
_______________ CXR Results  (Last 48 hours) None Medications given in the ED- Medications  
pantoprazole (PROTONIX) injection 40 mg (40 mg IntraVENous Given 3/4/19 1756) ondansetron [facility]) injection 4 mg (4 mg IntraVENous Given 3/4/19 1756)  
sodium chloride 0.9 % bolus infusion 1,000 mL (0 mL IntraVENous IV Completed 3/4/19 1826) potassium bicarbonate (KLYTE) tablet 50 mEq (50 mEq Oral Given 3/4/19 2013) dicyclomine (BENTYL) 10 mg/mL injection 20 mg (20 mg IntraMUSCular Given 3/4/19 2015)  
ondansetron (ZOFRAN) injection 4 mg (4 mg IntraVENous Given 3/4/19 2013) iopamidol (ISOVUE 300) 61 % contrast injection 100 mL (100 mL IntraVENous Given 3/4/19 1956)  
enoxaparin (LOVENOX) injection 60 mg (60 mg SubCUTAneous Given 3/4/19 2200) morphine injection 4 mg (4 mg IntraVENous Given 3/4/19 2204) [facility] injection 4 mg (4 mg IntraVENous Given 3/4/19 2202) Medical Decision Making I am the first provider for this patient. I reviewed the vital signs, available nursing notes, past medical history, past surgical history, family history and social history. Vital Signs-Reviewed the patient's vital signs. Pulse Oximetry Analysis - 100% on room air Cardiac Monitor: 
Rate: 96 bpm 
Rhythm: NSR Records Reviewed: Nursing Notes and Old Medical Records Provider Notes (Medical Decision Making): PUD, GERD, gastritis, CHS, gastroenteritis, colitis, diverticulitis, pancreatitis, hepatitis. Doubt cardiac. Procedures: 
Procedures ED Course:  
4:54 PM Initial assessment performed. The patients presenting problems have been discussed, and they are in agreement with the care plan formulated and outlined with them. I have encouraged them to ask questions as they arise throughout their visit. 
 
9:21 PM Discussed patient's history, exam, and available diagnostics results with Rupa Tavares MD, Emergency Medicine, who agrees to consult with vascular. FACE-TO-FACE PROGRESS NOTE: 
9:37 PM 
The patient presents with N/V. My exam shows pt appears in pain but her pain is in her throat. Imp/plan: She has CTA showing large clot in the inferior vena cava and right common iliac artery. Agree with plan. Will consult with vascular. I have personally seen and examined the patient, reviewed the MIL's note and agree with findings and plan. Written by Jerry Resendez, ED Scribe, as dictated by Rupa Tavares MD. 
 
9:39 PM Discussed patient's history, exam, and available diagnostics results with Rossy Choudhury MD, on call vascular surgeon, who has looked at the imaging and states that he will consult a college and call me back. 9:52 PM Received call back from Rossy Choudhury, on call vascular, who states that the patient should be transferred to [facility] to be seen by Dr. Linh Pearson, Vascular, who will be the accepting for an angio-vac procedure. He recommends giving Lovenox 1 mg /kg subcutaneously. 10:16 PM 
Asked to have pt transferred from ED to ED.  Discussed patient's history, exam, and available diagnostics results with Renato Garcia MD, ED attending at [facility], who accepts pt for ED to ED transfer. Written by UBALDO Dotson, as dictated by Ilana Mcgovern MD. Diagnosis and Disposition 9:58 PM 
I have spent 35 minutes of critical care time involved in lab review, consultations with specialist, family decision-making, and documentation. During this entire length of time I was immediately available to the patient. Critical Care: The reason for providing this level of medical care for this critically ill patient was due a critical illness that impaired one or more vital organ systems such that there was a high probability of imminent or life threatening deterioration in the patients condition. This care involved high complexity decision making to assess, manipulate, and support vital system functions, to treat this degree vital organ system failure and to prevent further life threatening deterioration of the patients condition. CONSULT NOTE:  
9:54 PM 
Leigh Dunn PA-C spoke with [facility] Special[facility]: Vascular Discussed pt's hx, disposition, and available diagnostic and imaging results. Reviewed care plans. Consulting physician agrees with plans as outlined. Written by UBALDO Deutsch, as dictated by Leigh Dunn PA-C 
TRANSFER PROGRESS NOTE: 
 
9:54 PM 
Discussed impending transfer with Patient and/or family. Pt and/or family instructed that Pt would be transferred to [facility].  Discussed reasoning for transfer and future treatment plan. Family and Pt understands and agrees with care plan. Written by UBALDO Deutsch, as dictated by Leigh Dunn PA-C. Labs Reviewed URINALYSIS W/ RFLX MICROSCOPIC - Abnormal; Notable for the following components:  
    Result Value Ketone 80 (*) Blood LARGE (*) Leukocyte Esterase TRACE (*) All other components within normal limits CBC WITH AUTOMATED DIFF - Abnormal; Notable for the following components: MCV 97.7 (*) NEUTROPHILS 84 (*) LYMPHOCYTES 11 (*)   
 ABS. LYMPHOCYTES 0.8 (*) All other components within normal limits METABOLIC PANEL, COMPREHENSIVE - Abnormal; Notable for the following components:  
 Potassium 3.0 (*) Glucose 101 (*) Creatinine 0.59 (*) All other components within normal limits DRUG SCREEN, URINE - Abnormal; Notable for the following components:  
 THC (TH-CANNABINOL) POSITIVE (*) All other components within normal limits URINE MICROSCOPIC ONLY - Abnormal; Notable for the following components:  
 Mucus 1+ (*) All other components within normal limits HCG URINE, QL  
LIPASE MAGNESIUM  
ETHYL ALCOHOL  
HCG QL SERUM  
PROTHROMBIN TIME + INR  
PTT No results found for this or any previous visit (from the past 12 hour(s)). CLINICAL IMPRESSION 1. Inferior vena cava thromboembolism (HCC) 2. Uterine leiomyoma, unspecified location 3. Marijuana use PLAN: 
1. Transfer to [facility] 
_______________________________ Attestations: This note is prepared by Nirmala Saeed, Marybeth Castro, and Lolis Terry, acting as Scribes for Emanuel Mitchell PA-C. Emanuel Mitchell PA-C:  The scribe's documentation has been prepared under my direction and personally reviewed by me in its entirety. I confirm that the note above accurately reflects all work, treatment, procedures, and medical decision making performed by me. This note is prepared by Marybeth Castro, acting as Scribe for Jm Alfonso MD. Jm Alfonso MD:  The scribe's documentation has been prepared under my direction and personally reviewed by me in its entirety. I confirm that the note above accurately reflects all work, treatment, procedures, and medical decision making performed by me. 
_______________________________

## 2019-03-05 NOTE — ED NOTES
3/5/19 0044: Faxed all information per request to [facility] [phone] after receiving release form. Charge RN aware.

## 2019-03-05 NOTE — ED NOTES
Notified Pat Gamez RN at [facility] regarding location of EMTALA form. [facility] dispatch aware. Charge RN aware.

## 2019-03-05 NOTE — ED NOTES
Jaron Ng RN with [facility] requesting chart be faxed since EMTALA was left behind. Notified them of release form request. [facility] advise they'll fax it. Charge RN aware.

## 2019-03-05 NOTE — ED NOTES
Pt resting in stretcher in NAD, reports continued nausea and RUQ tenderness. Pt declines PO potassium at this time, until nausea abates. CT aware pt ready.